# Patient Record
Sex: MALE | Race: OTHER | HISPANIC OR LATINO | URBAN - METROPOLITAN AREA
[De-identification: names, ages, dates, MRNs, and addresses within clinical notes are randomized per-mention and may not be internally consistent; named-entity substitution may affect disease eponyms.]

---

## 2017-05-14 ENCOUNTER — EMERGENCY (EMERGENCY)
Facility: HOSPITAL | Age: 57
LOS: 1 days | Discharge: HOME | End: 2017-05-14
Attending: EMERGENCY MEDICINE
Payer: COMMERCIAL

## 2017-05-14 VITALS
HEART RATE: 72 BPM | TEMPERATURE: 97 F | OXYGEN SATURATION: 100 % | SYSTOLIC BLOOD PRESSURE: 123 MMHG | RESPIRATION RATE: 20 BRPM | HEIGHT: 65 IN | DIASTOLIC BLOOD PRESSURE: 65 MMHG | WEIGHT: 154.98 LBS

## 2017-05-14 VITALS
HEART RATE: 74 BPM | DIASTOLIC BLOOD PRESSURE: 72 MMHG | OXYGEN SATURATION: 98 % | RESPIRATION RATE: 18 BRPM | TEMPERATURE: 98 F | SYSTOLIC BLOOD PRESSURE: 127 MMHG

## 2017-05-14 DIAGNOSIS — R07.89 OTHER CHEST PAIN: ICD-10-CM

## 2017-05-14 DIAGNOSIS — I38 ENDOCARDITIS, VALVE UNSPECIFIED: Chronic | ICD-10-CM

## 2017-05-14 DIAGNOSIS — K21.9 GASTRO-ESOPHAGEAL REFLUX DISEASE WITHOUT ESOPHAGITIS: ICD-10-CM

## 2017-05-14 DIAGNOSIS — Z98.890 OTHER SPECIFIED POSTPROCEDURAL STATES: ICD-10-CM

## 2017-05-14 LAB
ALBUMIN SERPL ELPH-MCNC: 4.2 G/DL — SIGNIFICANT CHANGE UP (ref 3.3–5.2)
ALP SERPL-CCNC: 68 U/L — SIGNIFICANT CHANGE UP (ref 40–120)
ALT FLD-CCNC: 35 U/L — SIGNIFICANT CHANGE UP
ANION GAP SERPL CALC-SCNC: 8 MMOL/L — SIGNIFICANT CHANGE UP (ref 5–17)
AST SERPL-CCNC: 56 U/L — HIGH
BASOPHILS # BLD AUTO: 0 K/UL — SIGNIFICANT CHANGE UP (ref 0–0.2)
BASOPHILS NFR BLD AUTO: 0.3 % — SIGNIFICANT CHANGE UP (ref 0–2)
BILIRUB SERPL-MCNC: 0.7 MG/DL — SIGNIFICANT CHANGE UP (ref 0.4–2)
BUN SERPL-MCNC: 14 MG/DL — SIGNIFICANT CHANGE UP (ref 8–20)
CALCIUM SERPL-MCNC: 9.3 MG/DL — SIGNIFICANT CHANGE UP (ref 8.6–10.2)
CHLORIDE SERPL-SCNC: 98 MMOL/L — SIGNIFICANT CHANGE UP (ref 98–107)
CO2 SERPL-SCNC: 28 MMOL/L — SIGNIFICANT CHANGE UP (ref 22–29)
CREAT SERPL-MCNC: 0.71 MG/DL — SIGNIFICANT CHANGE UP (ref 0.5–1.3)
EOSINOPHIL # BLD AUTO: 0.1 K/UL — SIGNIFICANT CHANGE UP (ref 0–0.5)
EOSINOPHIL NFR BLD AUTO: 0.9 % — SIGNIFICANT CHANGE UP (ref 0–5)
GLUCOSE SERPL-MCNC: 120 MG/DL — HIGH (ref 70–115)
HCT VFR BLD CALC: 42 % — SIGNIFICANT CHANGE UP (ref 42–52)
HGB BLD-MCNC: 14.7 G/DL — SIGNIFICANT CHANGE UP (ref 14–18)
LYMPHOCYTES # BLD AUTO: 0.9 K/UL — LOW (ref 1–4.8)
LYMPHOCYTES # BLD AUTO: 7.6 % — LOW (ref 20–55)
MCHC RBC-ENTMCNC: 32.1 PG — HIGH (ref 27–31)
MCHC RBC-ENTMCNC: 35 G/DL — SIGNIFICANT CHANGE UP (ref 32–36)
MCV RBC AUTO: 91.7 FL — SIGNIFICANT CHANGE UP (ref 80–94)
MONOCYTES # BLD AUTO: 0.8 K/UL — SIGNIFICANT CHANGE UP (ref 0–0.8)
MONOCYTES NFR BLD AUTO: 7.3 % — SIGNIFICANT CHANGE UP (ref 3–10)
NEUTROPHILS # BLD AUTO: 9.7 K/UL — HIGH (ref 1.8–8)
NEUTROPHILS NFR BLD AUTO: 83.6 % — HIGH (ref 37–73)
PLATELET # BLD AUTO: 225 K/UL — SIGNIFICANT CHANGE UP (ref 150–400)
POTASSIUM SERPL-MCNC: 4.6 MMOL/L — SIGNIFICANT CHANGE UP (ref 3.5–5.3)
POTASSIUM SERPL-SCNC: 4.6 MMOL/L — SIGNIFICANT CHANGE UP (ref 3.5–5.3)
PROT SERPL-MCNC: 7.5 G/DL — SIGNIFICANT CHANGE UP (ref 6.6–8.7)
RBC # BLD: 4.58 M/UL — LOW (ref 4.6–6.2)
RBC # FLD: 12.5 % — SIGNIFICANT CHANGE UP (ref 11–15.6)
SODIUM SERPL-SCNC: 134 MMOL/L — LOW (ref 135–145)
TROPONIN T SERPL-MCNC: <0.01 NG/ML — SIGNIFICANT CHANGE UP (ref 0–0.06)
TROPONIN T SERPL-MCNC: <0.01 NG/ML — SIGNIFICANT CHANGE UP (ref 0–0.06)
WBC # BLD: 11.6 K/UL — HIGH (ref 4.8–10.8)
WBC # FLD AUTO: 11.6 K/UL — HIGH (ref 4.8–10.8)

## 2017-05-14 PROCEDURE — 96374 THER/PROPH/DIAG INJ IV PUSH: CPT | Mod: XU

## 2017-05-14 PROCEDURE — 80053 COMPREHEN METABOLIC PANEL: CPT

## 2017-05-14 PROCEDURE — 84100 ASSAY OF PHOSPHORUS: CPT

## 2017-05-14 PROCEDURE — 71046 X-RAY EXAM CHEST 2 VIEWS: CPT

## 2017-05-14 PROCEDURE — 74177 CT ABD & PELVIS W/CONTRAST: CPT | Mod: 26

## 2017-05-14 PROCEDURE — 99284 EMERGENCY DEPT VISIT MOD MDM: CPT | Mod: 25

## 2017-05-14 PROCEDURE — 83690 ASSAY OF LIPASE: CPT

## 2017-05-14 PROCEDURE — 99285 EMERGENCY DEPT VISIT HI MDM: CPT | Mod: 25

## 2017-05-14 PROCEDURE — 85027 COMPLETE CBC AUTOMATED: CPT

## 2017-05-14 PROCEDURE — 71020: CPT | Mod: 26

## 2017-05-14 PROCEDURE — 84484 ASSAY OF TROPONIN QUANT: CPT

## 2017-05-14 PROCEDURE — 93010 ELECTROCARDIOGRAM REPORT: CPT

## 2017-05-14 PROCEDURE — 74177 CT ABD & PELVIS W/CONTRAST: CPT

## 2017-05-14 PROCEDURE — 93005 ELECTROCARDIOGRAM TRACING: CPT

## 2017-05-14 PROCEDURE — 83735 ASSAY OF MAGNESIUM: CPT

## 2017-05-14 RX ORDER — PANTOPRAZOLE SODIUM 20 MG/1
1 TABLET, DELAYED RELEASE ORAL
Qty: 30 | Refills: 0
Start: 2017-05-14 | End: 2017-06-13

## 2017-05-14 RX ORDER — SODIUM CHLORIDE 9 MG/ML
1000 INJECTION INTRAMUSCULAR; INTRAVENOUS; SUBCUTANEOUS
Qty: 0 | Refills: 0 | Status: DISCONTINUED | OUTPATIENT
Start: 2017-05-14 | End: 2017-05-18

## 2017-05-14 RX ORDER — SODIUM CHLORIDE 9 MG/ML
3 INJECTION INTRAMUSCULAR; INTRAVENOUS; SUBCUTANEOUS ONCE
Qty: 0 | Refills: 0 | Status: COMPLETED | OUTPATIENT
Start: 2017-05-14 | End: 2017-05-14

## 2017-05-14 RX ORDER — SODIUM,POTASSIUM PHOSPHATES 278-250MG
2 POWDER IN PACKET (EA) ORAL ONCE
Qty: 0 | Refills: 0 | Status: COMPLETED | OUTPATIENT
Start: 2017-05-14 | End: 2017-05-14

## 2017-05-14 RX ORDER — PANTOPRAZOLE SODIUM 20 MG/1
40 TABLET, DELAYED RELEASE ORAL ONCE
Qty: 0 | Refills: 0 | Status: COMPLETED | OUTPATIENT
Start: 2017-05-14 | End: 2017-05-14

## 2017-05-14 RX ORDER — SODIUM,POTASSIUM PHOSPHATES 278-250MG
2 POWDER IN PACKET (EA) ORAL ONCE
Qty: 0 | Refills: 0 | Status: DISCONTINUED | OUTPATIENT
Start: 2017-05-14 | End: 2017-05-14

## 2017-05-14 RX ADMIN — SODIUM CHLORIDE 125 MILLILITER(S): 9 INJECTION INTRAMUSCULAR; INTRAVENOUS; SUBCUTANEOUS at 05:54

## 2017-05-14 RX ADMIN — PANTOPRAZOLE SODIUM 40 MILLIGRAM(S): 20 TABLET, DELAYED RELEASE ORAL at 05:54

## 2017-05-14 RX ADMIN — Medication 2 TABLET(S): at 07:40

## 2017-05-14 RX ADMIN — SODIUM CHLORIDE 3 MILLILITER(S): 9 INJECTION INTRAMUSCULAR; INTRAVENOUS; SUBCUTANEOUS at 03:38

## 2017-05-14 NOTE — ED PROVIDER NOTE - DETAILS:
I, CARLA Barrera MD, personally performed the services described in the documentation, reviewed the documentation recorded by the scribe in my presence and it accurately and completely records my words and action

## 2017-05-14 NOTE — ED PROVIDER NOTE - NS ED ROS FT
no weight change, no fever or chills  no rash, no bruises  no visual changes no discharge  no cough cold or congestion,   + chest pain  no orthopnea, no pnd  + abd pain  no hematuria, no change in urinary habits  no headache, no paresthesia  no previous psych evaluation

## 2017-05-14 NOTE — ED PROVIDER NOTE - NS ED MD SCRIBE ATTENDING SCRIBE SECTIONS
HIV/VITAL SIGNS( Pullset)/PAST MEDICAL/SURGICAL/SOCIAL HISTORY/DISPOSITION/PHYSICAL EXAM/HISTORY OF PRESENT ILLNESS/REVIEW OF SYSTEMS

## 2017-05-14 NOTE — ED PROVIDER NOTE - PROGRESS NOTE DETAILS
remained comfortable in ed, ct pending troponin  no further complaints   signed out to am ed attending, check ct and troponin

## 2017-05-14 NOTE — ED ADULT NURSE NOTE - OBJECTIVE STATEMENT
Pt states "around 10 pm I started to have chest, abdominal and back pain", denies n/v/d, denies SOB, resp even and unlabored, showing NSR on monitor.

## 2017-05-14 NOTE — ED PROVIDER NOTE - OBJECTIVE STATEMENT
57 y/o M presents to the ED c/o worsening mid chest pain and diffuse abd pain that began 5 hours ago. Pt states his pain does not radiate to any other area. He reports that he has never experienced similar sx in the past. Pt states that his pain is not aggravated or relieved by any factors. Last meal at 1200 yesterday: chicken and rice. Denies fever, chills, or any other complaints at this time. No PMHx.

## 2017-05-14 NOTE — ED ADULT TRIAGE NOTE - CHIEF COMPLAINT QUOTE
Pt c/o cp, sob.  Radiation to back.  Pt states he took OTC Extenze at approx 2000 last night prior to symptom onset.  Pt reports cardiac hx.

## 2017-05-14 NOTE — ED PROVIDER NOTE - PHYSICAL EXAMINATION
Constitutinal :Appears comfortably, talking in full sentences  Head :NC AT , no swelling  Eyes :eomi, no swelling  Mouth :mm moist,  Neck : supple, trachea in midline  Chest :Pete air entry, symm chest expansion, no distress  Heart :S1 S2 distant  Abdomen :abd soft, non tender  Musc/Skel :ext no swelling, no deformity, no spine tenderness, distal pilses present  Neuro  :AAO 3 no focal deficits

## 2017-05-14 NOTE — ED ADULT NURSE REASSESSMENT NOTE - NS ED NURSE REASSESS COMMENT FT1
pt. received from night RN. pt. a&ox3. pt. denies pain or discomfort at this time. on cm. will continue to monitor.

## 2019-02-12 NOTE — ED PROVIDER NOTE - AGGRAVATING FACTORS
Operative Report


DATE OF SURGERY: 02/12/19


Operative Report: 





The risks benefits and alternatives of the procedure explained to the patient in

detail and informed consent is obtained.A GIF Olympus video scope was inserted 

into the patient's mouth and hypopharynx, the esophagus is identified intubated 

and insufflated, the scope was then advanced through the esophagus stomach and 

duodenum, retroflexion maneuver is done, the esophagus stomach and first and 

second portions of the duodenum examined.


PREOPERATIVE DIAGNOSIS: Nausea vomiting, cyclic vomiting syndrome


POSTOPERATIVE DIAGNOSIS: Esophagitis improved.  Hiatal hernia.  Residual food 

consistent with cyclic vomiting syndrome and gastroparesis status post Botox 

injection


OPERATION: EGD with submucosal injection


SURGEON: ROSA BRANDT


ANESTHESIA: Moderate Sedation - 4 mg of Versed, 75 mcg of fentanyl.  4 mg of 

Zofran.  Conscious sedation monitoring time 30 minutes.


TISSUE REMOVED OR ALTERED: None.


COMPLICATIONS: 





None.


ESTIMATED BLOOD LOSS: As noted above.


INTRAOPERATIVE FINDINGS: As noted above.


PROCEDURE: 





Patient tolerated the procedure well.


No immediate postprocedure complications are noted.


Patient discharged in good condition.


Discharge date 2/12/2019.


Discharge diet: Regular.


Discharge activity: Regular.


2-3-week follow-up to discuss findings.


Patient is instructed call the office or proceed to the emergency room should 

there be any further problems or questions.
none

## 2019-09-06 ENCOUNTER — EMERGENCY (EMERGENCY)
Facility: HOSPITAL | Age: 59
LOS: 1 days | Discharge: HOME | End: 2019-09-06
Attending: EMERGENCY MEDICINE
Payer: COMMERCIAL

## 2019-09-06 VITALS
HEIGHT: 64 IN | RESPIRATION RATE: 18 BRPM | HEART RATE: 69 BPM | SYSTOLIC BLOOD PRESSURE: 113 MMHG | DIASTOLIC BLOOD PRESSURE: 64 MMHG | TEMPERATURE: 99 F | WEIGHT: 167.99 LBS | OXYGEN SATURATION: 99 %

## 2019-09-06 DIAGNOSIS — I38 ENDOCARDITIS, VALVE UNSPECIFIED: Chronic | ICD-10-CM

## 2019-09-06 LAB
ALBUMIN SERPL ELPH-MCNC: 4.3 G/DL — SIGNIFICANT CHANGE UP (ref 3.3–5.2)
ALP SERPL-CCNC: 96 U/L — SIGNIFICANT CHANGE UP (ref 40–120)
ALT FLD-CCNC: 64 U/L — HIGH
ANION GAP SERPL CALC-SCNC: 11 MMOL/L — SIGNIFICANT CHANGE UP (ref 5–17)
AST SERPL-CCNC: 104 U/L — HIGH
BASOPHILS # BLD AUTO: 0.04 K/UL — SIGNIFICANT CHANGE UP (ref 0–0.2)
BASOPHILS NFR BLD AUTO: 0.4 % — SIGNIFICANT CHANGE UP (ref 0–2)
BILIRUB SERPL-MCNC: 1.1 MG/DL — SIGNIFICANT CHANGE UP (ref 0.4–2)
BUN SERPL-MCNC: 16 MG/DL — SIGNIFICANT CHANGE UP (ref 8–20)
CALCIUM SERPL-MCNC: 9.5 MG/DL — SIGNIFICANT CHANGE UP (ref 8.6–10.2)
CHLORIDE SERPL-SCNC: 100 MMOL/L — SIGNIFICANT CHANGE UP (ref 98–107)
CO2 SERPL-SCNC: 29 MMOL/L — SIGNIFICANT CHANGE UP (ref 22–29)
CREAT SERPL-MCNC: 0.99 MG/DL — SIGNIFICANT CHANGE UP (ref 0.5–1.3)
EOSINOPHIL # BLD AUTO: 0.09 K/UL — SIGNIFICANT CHANGE UP (ref 0–0.5)
EOSINOPHIL NFR BLD AUTO: 0.9 % — SIGNIFICANT CHANGE UP (ref 0–6)
GLUCOSE SERPL-MCNC: 106 MG/DL — SIGNIFICANT CHANGE UP (ref 70–115)
HCT VFR BLD CALC: 43.4 % — SIGNIFICANT CHANGE UP (ref 39–50)
HGB BLD-MCNC: 14.7 G/DL — SIGNIFICANT CHANGE UP (ref 13–17)
IMM GRANULOCYTES NFR BLD AUTO: 0.4 % — SIGNIFICANT CHANGE UP (ref 0–1.5)
LIDOCAIN IGE QN: 22 U/L — SIGNIFICANT CHANGE UP (ref 22–51)
LYMPHOCYTES # BLD AUTO: 1.09 K/UL — SIGNIFICANT CHANGE UP (ref 1–3.3)
LYMPHOCYTES # BLD AUTO: 11.1 % — LOW (ref 13–44)
MCHC RBC-ENTMCNC: 32 PG — SIGNIFICANT CHANGE UP (ref 27–34)
MCHC RBC-ENTMCNC: 33.9 GM/DL — SIGNIFICANT CHANGE UP (ref 32–36)
MCV RBC AUTO: 94.6 FL — SIGNIFICANT CHANGE UP (ref 80–100)
MONOCYTES # BLD AUTO: 0.7 K/UL — SIGNIFICANT CHANGE UP (ref 0–0.9)
MONOCYTES NFR BLD AUTO: 7.2 % — SIGNIFICANT CHANGE UP (ref 2–14)
NEUTROPHILS # BLD AUTO: 7.83 K/UL — HIGH (ref 1.8–7.4)
NEUTROPHILS NFR BLD AUTO: 80 % — HIGH (ref 43–77)
PLATELET # BLD AUTO: 252 K/UL — SIGNIFICANT CHANGE UP (ref 150–400)
POTASSIUM SERPL-MCNC: 4 MMOL/L — SIGNIFICANT CHANGE UP (ref 3.5–5.3)
POTASSIUM SERPL-SCNC: 4 MMOL/L — SIGNIFICANT CHANGE UP (ref 3.5–5.3)
PROT SERPL-MCNC: 7.5 G/DL — SIGNIFICANT CHANGE UP (ref 6.6–8.7)
RBC # BLD: 4.59 M/UL — SIGNIFICANT CHANGE UP (ref 4.2–5.8)
RBC # FLD: 11.9 % — SIGNIFICANT CHANGE UP (ref 10.3–14.5)
SODIUM SERPL-SCNC: 140 MMOL/L — SIGNIFICANT CHANGE UP (ref 135–145)
WBC # BLD: 9.79 K/UL — SIGNIFICANT CHANGE UP (ref 3.8–10.5)
WBC # FLD AUTO: 9.79 K/UL — SIGNIFICANT CHANGE UP (ref 3.8–10.5)

## 2019-09-06 PROCEDURE — 99284 EMERGENCY DEPT VISIT MOD MDM: CPT | Mod: 25

## 2019-09-06 PROCEDURE — 99284 EMERGENCY DEPT VISIT MOD MDM: CPT

## 2019-09-06 PROCEDURE — 76705 ECHO EXAM OF ABDOMEN: CPT | Mod: 26

## 2019-09-06 PROCEDURE — 76705 ECHO EXAM OF ABDOMEN: CPT

## 2019-09-06 PROCEDURE — 80053 COMPREHEN METABOLIC PANEL: CPT

## 2019-09-06 PROCEDURE — 36415 COLL VENOUS BLD VENIPUNCTURE: CPT

## 2019-09-06 PROCEDURE — 85027 COMPLETE CBC AUTOMATED: CPT

## 2019-09-06 PROCEDURE — 83690 ASSAY OF LIPASE: CPT

## 2019-09-06 NOTE — ED STATDOCS - OBJECTIVE STATEMENT
59 y/o M pt with significant PMHx of cholelithiasis presents to the ED c/o RUQ abdominal pain, onset this morning at 09:00. The pain is described as a squeezing sensation, and the pain radiates to the back. Associated sx of diaphoresis. Patient feels like he is unable to take a deep breath secondary to his pain. He reports that he has had multiple episodes of this abdominal pain for 1 year, varying in length for each episode. Today, he was given 800 mg of ibuprofen for his symptoms. Patient went to the ED on 04/25/18 for one of his previous episodes, and was diagnosed with Gall stones, but he recently had a sonogram performed on August 22nd, which was benign. Denies nausea, vomiting, dysuria, fever, testicular pain or penile pain. No further acute complaints at this time. 57 y/o M pt with significant PMHx of cholelithiasis presents to the ED c/o RUQ abdominal pain, onset this morning at 09:00. The pain is described as a squeezing sensation, and the pain radiates to the back. Associated sx of diaphoresis. Patient feels like he is unable to take a deep breath secondary to his pain. He reports that he has had multiple episodes of this abdominal pain for 1 year, varying in length for each episode. Had sono on 04/25/18 which demonstrated gallstones. Denies nausea, vomiting, dysuria, fever, testicular pain or penile pain. No further acute complaints at this time.

## 2019-09-06 NOTE — ED STATDOCS - PROGRESS NOTE DETAILS
PT evaluated by intake physician. HPI/PE/ROS as noted above. Will follow up plan per intake physician. Pt reevaluated, abdomen soft NT. PT denies pain at this time. PT toleratoing PO. PT verbalized understanding of diagnosis and importance of follow up at PMD. PT educated on importance of follow up and when to return to the ED. f/u with ACS

## 2019-09-06 NOTE — ED STATDOCS - PATIENT PORTAL LINK FT
You can access the FollowMyHealth Patient Portal offered by [facility] by registering at the following website: [email or web address]. By joining [facility]’s FollowMyHealth portal, you will also be able to view your health information using other applications (apps) compatible with our system.

## 2019-09-06 NOTE — ED STATDOCS - NS_ ATTENDINGSCRIBEDETAILS _ED_A_ED_FT
I, Joby Padilla, performed the initial face to face bedside interview with this patient regarding history of present illness, review of symptoms and relevant past medical, social and family history.  I completed an independent physical examination.  I was the provider who initially evaluated this patient.  The history, relevant review of systems, past medical and surgical history, medical decision making, and physical examination was documented by the scribe in my presence and I attest to the accuracy of the documentation. Follow-up on ordered tests (ie labs, radiologic studies) and re-evaluation of the patient's status has been communicated to the ACP.  Disposition of the patient will be based on test outcome and response to ED interventions.

## 2019-09-06 NOTE — ED ADULT NURSE NOTE - OBJECTIVE STATEMENT
58 yom presents to ed c/o RLQ pain denies n/v/d afebrile. gregg. seen treated released by md for similar pain told had gallstones

## 2019-09-18 PROBLEM — Z00.00 ENCOUNTER FOR PREVENTIVE HEALTH EXAMINATION: Status: ACTIVE | Noted: 2019-09-18

## 2019-09-19 ENCOUNTER — APPOINTMENT (OUTPATIENT)
Dept: TRAUMA SURGERY | Facility: CLINIC | Age: 59
End: 2019-09-19
Payer: COMMERCIAL

## 2019-09-19 VITALS
DIASTOLIC BLOOD PRESSURE: 64 MMHG | HEART RATE: 75 BPM | HEIGHT: 64.5 IN | SYSTOLIC BLOOD PRESSURE: 120 MMHG | BODY MASS INDEX: 27.66 KG/M2 | RESPIRATION RATE: 16 BRPM | WEIGHT: 164 LBS | OXYGEN SATURATION: 98 % | TEMPERATURE: 97.9 F

## 2019-09-19 DIAGNOSIS — K80.20 CALCULUS OF GALLBLADDER W/OUT CHOLECYSTITIS W/OUT OBSTRUCTION: ICD-10-CM

## 2019-09-19 DIAGNOSIS — Z83.6 FAMILY HISTORY OF OTHER DISEASES OF THE RESPIRATORY SYSTEM: ICD-10-CM

## 2019-09-19 DIAGNOSIS — Z80.49 FAMILY HISTORY OF MALIGNANT NEOPLASM OF OTHER GENITAL ORGANS: ICD-10-CM

## 2019-09-19 PROCEDURE — 99214 OFFICE O/P EST MOD 30 MIN: CPT

## 2019-09-19 NOTE — PLAN
[FreeTextEntry1] : The patient is for cardiac clearance.\par Once cleared, the patient will be scheduled for an elective laparoscopic versus open cholecystectomy and umbilical hernia repair.

## 2019-09-19 NOTE — PHYSICAL EXAM
[JVD] : no jugular venous distention  [Normal Breath Sounds] : Normal breath sounds [Normal Heart Sounds] : normal heart sounds [Abdomen Tenderness] : ~T ~M No abdominal tenderness [de-identified] : NAD [de-identified] : Abdomen is soft, nontender to time, examination of there is a reducible umbilical hernia appreciated.

## 2019-09-19 NOTE — REASON FOR VISIT
[Acute] : an acute visit [Spouse] : spouse [FreeTextEntry1] : The patient was seen in the emergency room for acute biliary colic. He is referred here for therapy and gallbladder removal

## 2019-09-23 ENCOUNTER — NON-APPOINTMENT (OUTPATIENT)
Age: 59
End: 2019-09-23

## 2019-09-23 ENCOUNTER — APPOINTMENT (OUTPATIENT)
Dept: CARDIOLOGY | Facility: CLINIC | Age: 59
End: 2019-09-23
Payer: COMMERCIAL

## 2019-09-23 VITALS
BODY MASS INDEX: 27.66 KG/M2 | HEIGHT: 64 IN | WEIGHT: 162 LBS | HEART RATE: 68 BPM | SYSTOLIC BLOOD PRESSURE: 118 MMHG | OXYGEN SATURATION: 95 % | DIASTOLIC BLOOD PRESSURE: 69 MMHG

## 2019-09-23 VITALS — SYSTOLIC BLOOD PRESSURE: 118 MMHG | DIASTOLIC BLOOD PRESSURE: 66 MMHG

## 2019-09-23 DIAGNOSIS — R94.31 ABNORMAL ELECTROCARDIOGRAM [ECG] [EKG]: ICD-10-CM

## 2019-09-23 DIAGNOSIS — Z78.9 OTHER SPECIFIED HEALTH STATUS: ICD-10-CM

## 2019-09-23 DIAGNOSIS — Q21.0 VENTRICULAR SEPTAL DEFECT: ICD-10-CM

## 2019-09-23 PROCEDURE — 99203 OFFICE O/P NEW LOW 30 MIN: CPT

## 2019-09-23 PROCEDURE — 93000 ELECTROCARDIOGRAM COMPLETE: CPT

## 2019-10-10 ENCOUNTER — OUTPATIENT (OUTPATIENT)
Dept: OUTPATIENT SERVICES | Facility: HOSPITAL | Age: 59
LOS: 1 days | End: 2019-10-10
Payer: COMMERCIAL

## 2019-10-10 DIAGNOSIS — Q21.0 VENTRICULAR SEPTAL DEFECT: ICD-10-CM

## 2019-10-10 DIAGNOSIS — R94.31 ABNORMAL ELECTROCARDIOGRAM [ECG] [EKG]: ICD-10-CM

## 2019-10-10 DIAGNOSIS — I38 ENDOCARDITIS, VALVE UNSPECIFIED: Chronic | ICD-10-CM

## 2019-10-10 PROCEDURE — 93018 CV STRESS TEST I&R ONLY: CPT

## 2019-10-10 PROCEDURE — 93016 CV STRESS TEST SUPVJ ONLY: CPT

## 2019-10-10 PROCEDURE — 93017 CV STRESS TEST TRACING ONLY: CPT

## 2019-10-10 PROCEDURE — 93306 TTE W/DOPPLER COMPLETE: CPT | Mod: 26

## 2019-10-10 PROCEDURE — 93306 TTE W/DOPPLER COMPLETE: CPT

## 2019-10-11 NOTE — ASSESSMENT
[FreeTextEntry1] : EKG- NSR, RBBB\par \par Assessment:\par 1. Abnormal EKG\par 2. S/P VSD Repair at age 15 at [facility]\par \par Recommendations:\par 1. ECHO\par 2. Stress Test\par 3. F/U in 1 year\par \par Patients perioperative cardiac risk assessment to be addressed after the stress test.

## 2019-10-11 NOTE — ADDENDUM
[FreeTextEntry1] : Stress Test- Normal.\par ECHO- LVEF normal. MVP with Mild to Moderte MR otherwise normal.\par \par PATIENT IS LOW RISK FOR PERIOPERATIVE CARDIAC EVENTS.\par NO ABSOLUTE CONTRAINDICATIONS TO PROCEED WITH THE GALLBLADEER SURGERY..

## 2019-10-11 NOTE — PHYSICAL EXAM
[General Appearance - Well Developed] : well developed [General Appearance - Well Nourished] : well nourished [Normal Conjunctiva] : the conjunctiva exhibited no abnormalities [Normal Oral Mucosa] : normal oral mucosa [Heart Sounds] : normal S1 and S2 [Auscultation Breath Sounds / Voice Sounds] : lungs were clear to auscultation bilaterally [Bowel Sounds] : normal bowel sounds [Abdomen Soft] : soft [Abnormal Walk] : normal gait [] : no rash [FreeTextEntry1] : No JVD

## 2019-10-11 NOTE — HISTORY OF PRESENT ILLNESS
[FreeTextEntry1] : Preop for Cholecystectomy.\par \par The patient is a 58-year-old  male with a history of a VSD repair at age 15 presents for a preoperative cardiac evaluation prior to undergoing cholecystectomy. Patient denies any chest pain, dyspnea, palpitations, syncope. Patient has no complaints except for symptoms related to his gallbladder.\par \par Patient has no history of diabetes, no history of hypertension, no history of prior coronary artery disease, no history of CHF. No history of cardiac arrhythmias.\par \par Patients aunt in the room

## 2019-10-26 ENCOUNTER — INPATIENT (INPATIENT)
Facility: HOSPITAL | Age: 59
LOS: 2 days | Discharge: HOME | DRG: 419 | End: 2019-10-29
Attending: STUDENT IN AN ORGANIZED HEALTH CARE EDUCATION/TRAINING PROGRAM | Admitting: STUDENT IN AN ORGANIZED HEALTH CARE EDUCATION/TRAINING PROGRAM
Payer: COMMERCIAL

## 2019-10-26 VITALS
DIASTOLIC BLOOD PRESSURE: 72 MMHG | OXYGEN SATURATION: 97 % | TEMPERATURE: 98 F | SYSTOLIC BLOOD PRESSURE: 123 MMHG | HEART RATE: 70 BPM | WEIGHT: 166.89 LBS | RESPIRATION RATE: 18 BRPM | HEIGHT: 65 IN

## 2019-10-26 DIAGNOSIS — I38 ENDOCARDITIS, VALVE UNSPECIFIED: Chronic | ICD-10-CM

## 2019-10-26 PROCEDURE — 99285 EMERGENCY DEPT VISIT HI MDM: CPT

## 2019-10-26 RX ORDER — SODIUM CHLORIDE 9 MG/ML
1000 INJECTION INTRAMUSCULAR; INTRAVENOUS; SUBCUTANEOUS ONCE
Refills: 0 | Status: COMPLETED | OUTPATIENT
Start: 2019-10-26 | End: 2019-10-26

## 2019-10-26 RX ORDER — ONDANSETRON 8 MG/1
4 TABLET, FILM COATED ORAL ONCE
Refills: 0 | Status: DISCONTINUED | OUTPATIENT
Start: 2019-10-26 | End: 2019-10-28

## 2019-10-26 RX ORDER — MORPHINE SULFATE 50 MG/1
4 CAPSULE, EXTENDED RELEASE ORAL ONCE
Refills: 0 | Status: DISCONTINUED | OUTPATIENT
Start: 2019-10-26 | End: 2019-10-26

## 2019-10-26 NOTE — ED ADULT NURSE NOTE - CHPI ED NUR SYMPTOMS NEG
no blood in stool/no chills/no hematuria/no fever/no nausea/no abdominal distension/no diarrhea/no vomiting/no burning urination

## 2019-10-26 NOTE — ED ADULT TRIAGE NOTE - CHIEF COMPLAINT QUOTE
"I am having a gall bladder attack".  Pt. complaining of RLQ and RUQ abdominal pain that began thursday but became significantly worse today.  Pt. states he was recently seen here 10/19/19 and was examined by MD Gandhi; pt. was supposed to receive a call to schedule his gall bladder out but has not yet and pain has come back.  Pt. denies n/v/d.

## 2019-10-26 NOTE — ED PROVIDER NOTE - PROGRESS NOTE DETAILS
Surgery called Case s/o to Dr Shearer Pt signed out to me by Dr. Hansen pending labs and imaging.  US shows cholelithiasis. Pt with gallstone pancreatitis and elevated liver enzymes. surgery to admit.

## 2019-10-26 NOTE — ED PROVIDER NOTE - CLINICAL SUMMARY MEDICAL DECISION MAKING FREE TEXT BOX
59 y/o M with hx of gallstones presents with RUQ pain concern for cholecysits obtain us labs and reeval. 57 y/o M pt, with hx of gallstones, presents with RUQ pain. Concern for cholecystitis. Will obtain US, labs, and reevaluate.

## 2019-10-26 NOTE — ED PROVIDER NOTE - OBJECTIVE STATEMENT
59 y/o present with 4x RUQ abd pain no nvd     dx gallstones in the past but has not f/u since. No cp sob. worse with food intake  ros abd pain  pe appear well   ruq ttp  (+) beyer   RLQ tenderness 57 y/o M pt with PMHx of cholelithiasis presents to the ED c/o RUQ abd pain that began 4 days ago. Denies nausea, vomiting, and diarrhea. Pt says that he was Dx with gallstones in the past but has not followup with his MD for the condition. Notes that pain is worsened with food intake.  Also denies CP and SOB. No further acute complaints at this time.

## 2019-10-27 ENCOUNTER — TRANSCRIPTION ENCOUNTER (OUTPATIENT)
Age: 59
End: 2019-10-27

## 2019-10-27 DIAGNOSIS — K85.10 BILIARY ACUTE PANCREATITIS WITHOUT NECROSIS OR INFECTION: ICD-10-CM

## 2019-10-27 LAB
ALBUMIN SERPL ELPH-MCNC: 3.7 G/DL — SIGNIFICANT CHANGE UP (ref 3.3–5.2)
ALBUMIN SERPL ELPH-MCNC: 4.1 G/DL — SIGNIFICANT CHANGE UP (ref 3.3–5.2)
ALP SERPL-CCNC: 129 U/L — HIGH (ref 40–120)
ALP SERPL-CCNC: 137 U/L — HIGH (ref 40–120)
ALT FLD-CCNC: 124 U/L — HIGH
ALT FLD-CCNC: 160 U/L — HIGH
ANION GAP SERPL CALC-SCNC: 13 MMOL/L — SIGNIFICANT CHANGE UP (ref 5–17)
ANION GAP SERPL CALC-SCNC: 14 MMOL/L — SIGNIFICANT CHANGE UP (ref 5–17)
APPEARANCE UR: CLEAR — SIGNIFICANT CHANGE UP
AST SERPL-CCNC: 106 U/L — HIGH
AST SERPL-CCNC: 63 U/L — HIGH
BACTERIA # UR AUTO: ABNORMAL
BASOPHILS # BLD AUTO: 0.04 K/UL — SIGNIFICANT CHANGE UP (ref 0–0.2)
BASOPHILS # BLD AUTO: 0.05 K/UL — SIGNIFICANT CHANGE UP (ref 0–0.2)
BASOPHILS NFR BLD AUTO: 0.4 % — SIGNIFICANT CHANGE UP (ref 0–2)
BASOPHILS NFR BLD AUTO: 0.6 % — SIGNIFICANT CHANGE UP (ref 0–2)
BILIRUB SERPL-MCNC: 1.5 MG/DL — SIGNIFICANT CHANGE UP (ref 0.4–2)
BILIRUB SERPL-MCNC: 4 MG/DL — HIGH (ref 0.4–2)
BILIRUB UR-MCNC: NEGATIVE — SIGNIFICANT CHANGE UP
BLD GP AB SCN SERPL QL: SIGNIFICANT CHANGE UP
BUN SERPL-MCNC: 11 MG/DL — SIGNIFICANT CHANGE UP (ref 8–20)
BUN SERPL-MCNC: 9 MG/DL — SIGNIFICANT CHANGE UP (ref 8–20)
CALCIUM SERPL-MCNC: 9 MG/DL — SIGNIFICANT CHANGE UP (ref 8.6–10.2)
CALCIUM SERPL-MCNC: 9.2 MG/DL — SIGNIFICANT CHANGE UP (ref 8.6–10.2)
CHLORIDE SERPL-SCNC: 100 MMOL/L — SIGNIFICANT CHANGE UP (ref 98–107)
CHLORIDE SERPL-SCNC: 102 MMOL/L — SIGNIFICANT CHANGE UP (ref 98–107)
CO2 SERPL-SCNC: 22 MMOL/L — SIGNIFICANT CHANGE UP (ref 22–29)
CO2 SERPL-SCNC: 24 MMOL/L — SIGNIFICANT CHANGE UP (ref 22–29)
COLOR SPEC: YELLOW — SIGNIFICANT CHANGE UP
CREAT SERPL-MCNC: 0.65 MG/DL — SIGNIFICANT CHANGE UP (ref 0.5–1.3)
CREAT SERPL-MCNC: 0.69 MG/DL — SIGNIFICANT CHANGE UP (ref 0.5–1.3)
DIFF PNL FLD: ABNORMAL
EOSINOPHIL # BLD AUTO: 0.13 K/UL — SIGNIFICANT CHANGE UP (ref 0–0.5)
EOSINOPHIL # BLD AUTO: 0.15 K/UL — SIGNIFICANT CHANGE UP (ref 0–0.5)
EOSINOPHIL NFR BLD AUTO: 1.7 % — SIGNIFICANT CHANGE UP (ref 0–6)
EOSINOPHIL NFR BLD AUTO: 1.7 % — SIGNIFICANT CHANGE UP (ref 0–6)
EPI CELLS # UR: SIGNIFICANT CHANGE UP
GLUCOSE SERPL-MCNC: 110 MG/DL — SIGNIFICANT CHANGE UP (ref 70–115)
GLUCOSE SERPL-MCNC: 80 MG/DL — SIGNIFICANT CHANGE UP (ref 70–115)
GLUCOSE UR QL: NEGATIVE MG/DL — SIGNIFICANT CHANGE UP
HCT VFR BLD CALC: 40 % — SIGNIFICANT CHANGE UP (ref 39–50)
HCT VFR BLD CALC: 40.8 % — SIGNIFICANT CHANGE UP (ref 39–50)
HGB BLD-MCNC: 13.4 G/DL — SIGNIFICANT CHANGE UP (ref 13–17)
HGB BLD-MCNC: 13.9 G/DL — SIGNIFICANT CHANGE UP (ref 13–17)
IMM GRANULOCYTES NFR BLD AUTO: 0.3 % — SIGNIFICANT CHANGE UP (ref 0–1.5)
IMM GRANULOCYTES NFR BLD AUTO: 0.5 % — SIGNIFICANT CHANGE UP (ref 0–1.5)
KETONES UR-MCNC: NEGATIVE — SIGNIFICANT CHANGE UP
LDH SERPL L TO P-CCNC: 150 U/L — SIGNIFICANT CHANGE UP (ref 98–192)
LEUKOCYTE ESTERASE UR-ACNC: NEGATIVE — SIGNIFICANT CHANGE UP
LIDOCAIN IGE QN: 2828 U/L — HIGH (ref 22–51)
LYMPHOCYTES # BLD AUTO: 1.21 K/UL — SIGNIFICANT CHANGE UP (ref 1–3.3)
LYMPHOCYTES # BLD AUTO: 1.48 K/UL — SIGNIFICANT CHANGE UP (ref 1–3.3)
LYMPHOCYTES # BLD AUTO: 15.5 % — SIGNIFICANT CHANGE UP (ref 13–44)
LYMPHOCYTES # BLD AUTO: 16.4 % — SIGNIFICANT CHANGE UP (ref 13–44)
MAGNESIUM SERPL-MCNC: 1.9 MG/DL — SIGNIFICANT CHANGE UP (ref 1.6–2.6)
MCHC RBC-ENTMCNC: 31.5 PG — SIGNIFICANT CHANGE UP (ref 27–34)
MCHC RBC-ENTMCNC: 31.8 PG — SIGNIFICANT CHANGE UP (ref 27–34)
MCHC RBC-ENTMCNC: 33.5 GM/DL — SIGNIFICANT CHANGE UP (ref 32–36)
MCHC RBC-ENTMCNC: 34.1 GM/DL — SIGNIFICANT CHANGE UP (ref 32–36)
MCV RBC AUTO: 93.4 FL — SIGNIFICANT CHANGE UP (ref 80–100)
MCV RBC AUTO: 93.9 FL — SIGNIFICANT CHANGE UP (ref 80–100)
MONOCYTES # BLD AUTO: 0.74 K/UL — SIGNIFICANT CHANGE UP (ref 0–0.9)
MONOCYTES # BLD AUTO: 0.8 K/UL — SIGNIFICANT CHANGE UP (ref 0–0.9)
MONOCYTES NFR BLD AUTO: 8.9 % — SIGNIFICANT CHANGE UP (ref 2–14)
MONOCYTES NFR BLD AUTO: 9.5 % — SIGNIFICANT CHANGE UP (ref 2–14)
NEUTROPHILS # BLD AUTO: 5.66 K/UL — SIGNIFICANT CHANGE UP (ref 1.8–7.4)
NEUTROPHILS # BLD AUTO: 6.51 K/UL — SIGNIFICANT CHANGE UP (ref 1.8–7.4)
NEUTROPHILS NFR BLD AUTO: 72.2 % — SIGNIFICANT CHANGE UP (ref 43–77)
NEUTROPHILS NFR BLD AUTO: 72.3 % — SIGNIFICANT CHANGE UP (ref 43–77)
NITRITE UR-MCNC: NEGATIVE — SIGNIFICANT CHANGE UP
PH UR: 6 — SIGNIFICANT CHANGE UP (ref 5–8)
PLATELET # BLD AUTO: 218 K/UL — SIGNIFICANT CHANGE UP (ref 150–400)
PLATELET # BLD AUTO: 242 K/UL — SIGNIFICANT CHANGE UP (ref 150–400)
POTASSIUM SERPL-MCNC: 3.9 MMOL/L — SIGNIFICANT CHANGE UP (ref 3.5–5.3)
POTASSIUM SERPL-MCNC: 4.3 MMOL/L — SIGNIFICANT CHANGE UP (ref 3.5–5.3)
POTASSIUM SERPL-SCNC: 3.9 MMOL/L — SIGNIFICANT CHANGE UP (ref 3.5–5.3)
POTASSIUM SERPL-SCNC: 4.3 MMOL/L — SIGNIFICANT CHANGE UP (ref 3.5–5.3)
PROT SERPL-MCNC: 6.6 G/DL — SIGNIFICANT CHANGE UP (ref 6.6–8.7)
PROT SERPL-MCNC: 6.9 G/DL — SIGNIFICANT CHANGE UP (ref 6.6–8.7)
PROT UR-MCNC: 15 MG/DL
RBC # BLD: 4.26 M/UL — SIGNIFICANT CHANGE UP (ref 4.2–5.8)
RBC # BLD: 4.37 M/UL — SIGNIFICANT CHANGE UP (ref 4.2–5.8)
RBC # FLD: 11.9 % — SIGNIFICANT CHANGE UP (ref 10.3–14.5)
RBC # FLD: 12.1 % — SIGNIFICANT CHANGE UP (ref 10.3–14.5)
RBC CASTS # UR COMP ASSIST: ABNORMAL /HPF (ref 0–4)
SODIUM SERPL-SCNC: 136 MMOL/L — SIGNIFICANT CHANGE UP (ref 135–145)
SODIUM SERPL-SCNC: 139 MMOL/L — SIGNIFICANT CHANGE UP (ref 135–145)
SP GR SPEC: 1.02 — SIGNIFICANT CHANGE UP (ref 1.01–1.02)
UROBILINOGEN FLD QL: NEGATIVE MG/DL — SIGNIFICANT CHANGE UP
WBC # BLD: 7.83 K/UL — SIGNIFICANT CHANGE UP (ref 3.8–10.5)
WBC # BLD: 9.01 K/UL — SIGNIFICANT CHANGE UP (ref 3.8–10.5)
WBC # FLD AUTO: 7.83 K/UL — SIGNIFICANT CHANGE UP (ref 3.8–10.5)
WBC # FLD AUTO: 9.01 K/UL — SIGNIFICANT CHANGE UP (ref 3.8–10.5)
WBC UR QL: SIGNIFICANT CHANGE UP

## 2019-10-27 PROCEDURE — 93010 ELECTROCARDIOGRAM REPORT: CPT

## 2019-10-27 PROCEDURE — 99223 1ST HOSP IP/OBS HIGH 75: CPT | Mod: 57

## 2019-10-27 PROCEDURE — 71045 X-RAY EXAM CHEST 1 VIEW: CPT | Mod: 26

## 2019-10-27 PROCEDURE — 76705 ECHO EXAM OF ABDOMEN: CPT | Mod: 26

## 2019-10-27 RX ORDER — MORPHINE SULFATE 50 MG/1
4 CAPSULE, EXTENDED RELEASE ORAL EVERY 4 HOURS
Refills: 0 | Status: DISCONTINUED | OUTPATIENT
Start: 2019-10-27 | End: 2019-10-28

## 2019-10-27 RX ORDER — ONDANSETRON 8 MG/1
4 TABLET, FILM COATED ORAL EVERY 6 HOURS
Refills: 0 | Status: DISCONTINUED | OUTPATIENT
Start: 2019-10-27 | End: 2019-10-28

## 2019-10-27 RX ORDER — IBUPROFEN 200 MG
600 TABLET ORAL EVERY 6 HOURS
Refills: 0 | Status: DISCONTINUED | OUTPATIENT
Start: 2019-10-27 | End: 2019-10-28

## 2019-10-27 RX ORDER — SODIUM CHLORIDE 9 MG/ML
1000 INJECTION, SOLUTION INTRAVENOUS
Refills: 0 | Status: DISCONTINUED | OUTPATIENT
Start: 2019-10-27 | End: 2019-10-28

## 2019-10-27 RX ORDER — MORPHINE SULFATE 50 MG/1
2 CAPSULE, EXTENDED RELEASE ORAL EVERY 4 HOURS
Refills: 0 | Status: DISCONTINUED | OUTPATIENT
Start: 2019-10-27 | End: 2019-10-28

## 2019-10-27 RX ORDER — HEPARIN SODIUM 5000 [USP'U]/ML
5000 INJECTION INTRAVENOUS; SUBCUTANEOUS EVERY 8 HOURS
Refills: 0 | Status: DISCONTINUED | OUTPATIENT
Start: 2019-10-27 | End: 2019-10-28

## 2019-10-27 RX ADMIN — MORPHINE SULFATE 4 MILLIGRAM(S): 50 CAPSULE, EXTENDED RELEASE ORAL at 02:39

## 2019-10-27 RX ADMIN — SODIUM CHLORIDE 150 MILLILITER(S): 9 INJECTION, SOLUTION INTRAVENOUS at 15:29

## 2019-10-27 RX ADMIN — HEPARIN SODIUM 5000 UNIT(S): 5000 INJECTION INTRAVENOUS; SUBCUTANEOUS at 15:20

## 2019-10-27 RX ADMIN — SODIUM CHLORIDE 150 MILLILITER(S): 9 INJECTION, SOLUTION INTRAVENOUS at 21:31

## 2019-10-27 RX ADMIN — HEPARIN SODIUM 5000 UNIT(S): 5000 INJECTION INTRAVENOUS; SUBCUTANEOUS at 21:31

## 2019-10-27 RX ADMIN — SODIUM CHLORIDE 150 MILLILITER(S): 9 INJECTION, SOLUTION INTRAVENOUS at 08:44

## 2019-10-27 RX ADMIN — SODIUM CHLORIDE 1000 MILLILITER(S): 9 INJECTION INTRAMUSCULAR; INTRAVENOUS; SUBCUTANEOUS at 02:33

## 2019-10-27 NOTE — H&P ADULT - HISTORY OF PRESENT ILLNESS
ACUTE CARE SURGERY CONSULT    HPI: 58y Male w no PMH/PSH that presented to ED c/o severe RUQ pain that started 3 days ago and is described as sharp and constant associated with chills. Pain is not associated with meals, fevers, and vomiting. Patient came to the ED almost a month ago with similar complaints and was diagnosed with cholelithiasis and given a follow up appointment with surgery for cholecystectomy as an outpatient. Patient has no other complaints. Last meal was yesterday in the afternoon and is having regular BM and passing flatus.     ROS: 10-system review is otherwise negative except HPI above.      PAST MEDICAL & SURGICAL HISTORY:  No pertinent past medical history  Heart valve regurgitation: as infant  Heart surgery for valve regurgitation at 16yo      SOCIAL HISTORY:  Denies any toxic habits    ALLERGIES: NKA No Known Allergies      HOME MEDICATIONS:   No home medications   --------------------------------------------------------------------------------------------  PHYSICAL EXAM:   General: NAD, Lying in bed comfortably  Neuro: A+Ox3  HEENT: EOMI, PERDELIA, MMM  Cardio: RRR   Resp: Non labored breathing   GI/Abd: Soft, NT/ND, no rebound/guarding, no masses palpated  Musculoskeletal: All 4 extremities moving spontaneously, no limitations, no spinal tenderness or stepoffs  --------------------------------------------------------------------------------------------    LABS                 13.9   7.83   )----------(  242       ( 27 Oct 2019 00:53 )               40.8      136    |  100    |  11.0   ----------------------------<  110        ( 27 Oct 2019 00:53 )  3.9     |  22.0   |  0.65     Ca    9.2        ( 27 Oct 2019 00:53 )    TPro  6.9    /  Alb  4.1    /  TBili  4.0    /  DBili  x      /  AST  106    /  ALT  160    /  AlkPhos  137    ( 27 Oct 2019 00:53 )    LIVER FUNCTIONS - ( 27 Oct 2019 00:53 )  Alb: 4.1 g/dL / Pro: 6.9 g/dL / ALK PHOS: 137 U/L / ALT: 160 U/L / AST: 106 U/L / GGT: x               CAPILLARY BLOOD GLUCOSE        Urinalysis Basic - ( 27 Oct 2019 01:20 )    Color: Yellow / Appearance: Clear / S.020 / pH: x  Gluc: x / Ketone: Negative  / Bili: Negative / Urobili: Negative mg/dL   Blood: x / Protein: 15 mg/dL / Nitrite: Negative   Leuk Esterase: Negative / RBC: 3-5 /HPF / WBC 0-2   Sq Epi: x / Non Sq Epi: Occasional / Bacteria: Occasional  --------------------------------------------------------------------------------------------  IMAGING  US: Cholelithiasis. GBW thickening of 0.32cm, CBD 2.8cm. Poorly visualized CBD.

## 2019-10-27 NOTE — H&P ADULT - ATTENDING COMMENTS
gsp  avss  abd soft mild epigastric and luq pain  labs reviewed  u/s reviewed  dz: gallstone pancreatitis   admit to acs  npo  ivf  mrcp  will need lap angelita on this admission

## 2019-10-27 NOTE — H&P ADULT - ASSESSMENT
ASSESSMENT: Patient is a 58y old male with laboratory values suggesting gallstone pancreatitis, Lipase 2828, Direct bili 4.0. Imaging studies are somewhat inconclusive.     PLAN:    - Admit to [facility] floor under Dr. Love  - NPO/IVF  - MRCP  - Possible cholecystectomy in this admission  - pain control  - DVT ppx  - OOB/ambulate  - strict I/Os

## 2019-10-27 NOTE — ED ADULT NURSE REASSESSMENT NOTE - NS ED NURSE REASSESS COMMENT FT1
Pt is A&OX3. Pt denies pain, N/V/D. Pt VSS. Pt in NAD at this time. Pt moves all extremities equal and bilateral. Plan of care and wait time explained. Pt awaiting  admit bed. Safety maintained.

## 2019-10-27 NOTE — ED ADULT NURSE REASSESSMENT NOTE - NS ED NURSE REASSESS COMMENT FT1
pt. received from night RN. pt. a&ox3. pt. states he had abdominal pain but denies tawnya or discomfort at this time. pt. in no apparent distress, breathing even and unlabored. awaiting bed. informed on plan of care.

## 2019-10-28 ENCOUNTER — RESULT REVIEW (OUTPATIENT)
Age: 59
End: 2019-10-28

## 2019-10-28 ENCOUNTER — TRANSCRIPTION ENCOUNTER (OUTPATIENT)
Age: 59
End: 2019-10-28

## 2019-10-28 LAB
ALBUMIN SERPL ELPH-MCNC: 3.8 G/DL — SIGNIFICANT CHANGE UP (ref 3.3–5.2)
ALP SERPL-CCNC: 128 U/L — HIGH (ref 40–120)
ALT FLD-CCNC: 93 U/L — HIGH
ANION GAP SERPL CALC-SCNC: 13 MMOL/L — SIGNIFICANT CHANGE UP (ref 5–17)
AST SERPL-CCNC: 41 U/L — HIGH
BASOPHILS # BLD AUTO: 0.04 K/UL — SIGNIFICANT CHANGE UP (ref 0–0.2)
BASOPHILS NFR BLD AUTO: 0.5 % — SIGNIFICANT CHANGE UP (ref 0–2)
BILIRUB DIRECT SERPL-MCNC: 0.4 MG/DL — HIGH (ref 0–0.3)
BILIRUB INDIRECT FLD-MCNC: 0.8 MG/DL — SIGNIFICANT CHANGE UP (ref 0.2–1)
BILIRUB SERPL-MCNC: 1.2 MG/DL — SIGNIFICANT CHANGE UP (ref 0.4–2)
BUN SERPL-MCNC: 7 MG/DL — LOW (ref 8–20)
CALCIUM SERPL-MCNC: 9.1 MG/DL — SIGNIFICANT CHANGE UP (ref 8.6–10.2)
CHLORIDE SERPL-SCNC: 99 MMOL/L — SIGNIFICANT CHANGE UP (ref 98–107)
CO2 SERPL-SCNC: 24 MMOL/L — SIGNIFICANT CHANGE UP (ref 22–29)
CREAT SERPL-MCNC: 0.71 MG/DL — SIGNIFICANT CHANGE UP (ref 0.5–1.3)
EOSINOPHIL # BLD AUTO: 0.29 K/UL — SIGNIFICANT CHANGE UP (ref 0–0.5)
EOSINOPHIL NFR BLD AUTO: 3.4 % — SIGNIFICANT CHANGE UP (ref 0–6)
GLUCOSE SERPL-MCNC: 68 MG/DL — LOW (ref 70–115)
HCT VFR BLD CALC: 39 % — SIGNIFICANT CHANGE UP (ref 39–50)
HCV AB S/CO SERPL IA: 0.11 S/CO — SIGNIFICANT CHANGE UP (ref 0–0.99)
HCV AB SERPL-IMP: SIGNIFICANT CHANGE UP
HGB BLD-MCNC: 13.4 G/DL — SIGNIFICANT CHANGE UP (ref 13–17)
IMM GRANULOCYTES NFR BLD AUTO: 0.5 % — SIGNIFICANT CHANGE UP (ref 0–1.5)
LYMPHOCYTES # BLD AUTO: 1.4 K/UL — SIGNIFICANT CHANGE UP (ref 1–3.3)
LYMPHOCYTES # BLD AUTO: 16.4 % — SIGNIFICANT CHANGE UP (ref 13–44)
MAGNESIUM SERPL-MCNC: 1.9 MG/DL — SIGNIFICANT CHANGE UP (ref 1.6–2.6)
MCHC RBC-ENTMCNC: 32.1 PG — SIGNIFICANT CHANGE UP (ref 27–34)
MCHC RBC-ENTMCNC: 34.4 GM/DL — SIGNIFICANT CHANGE UP (ref 32–36)
MCV RBC AUTO: 93.3 FL — SIGNIFICANT CHANGE UP (ref 80–100)
MONOCYTES # BLD AUTO: 0.97 K/UL — HIGH (ref 0–0.9)
MONOCYTES NFR BLD AUTO: 11.4 % — SIGNIFICANT CHANGE UP (ref 2–14)
NEUTROPHILS # BLD AUTO: 5.78 K/UL — SIGNIFICANT CHANGE UP (ref 1.8–7.4)
NEUTROPHILS NFR BLD AUTO: 67.8 % — SIGNIFICANT CHANGE UP (ref 43–77)
PHOSPHATE SERPL-MCNC: 2.4 MG/DL — SIGNIFICANT CHANGE UP (ref 2.4–4.7)
PLATELET # BLD AUTO: 219 K/UL — SIGNIFICANT CHANGE UP (ref 150–400)
POTASSIUM SERPL-MCNC: 4.1 MMOL/L — SIGNIFICANT CHANGE UP (ref 3.5–5.3)
POTASSIUM SERPL-SCNC: 4.1 MMOL/L — SIGNIFICANT CHANGE UP (ref 3.5–5.3)
PROT SERPL-MCNC: 7 G/DL — SIGNIFICANT CHANGE UP (ref 6.6–8.7)
RBC # BLD: 4.18 M/UL — LOW (ref 4.2–5.8)
RBC # FLD: 12 % — SIGNIFICANT CHANGE UP (ref 10.3–14.5)
SODIUM SERPL-SCNC: 136 MMOL/L — SIGNIFICANT CHANGE UP (ref 135–145)
WBC # BLD: 8.52 K/UL — SIGNIFICANT CHANGE UP (ref 3.8–10.5)
WBC # FLD AUTO: 8.52 K/UL — SIGNIFICANT CHANGE UP (ref 3.8–10.5)

## 2019-10-28 PROCEDURE — 88304 TISSUE EXAM BY PATHOLOGIST: CPT | Mod: 26

## 2019-10-28 PROCEDURE — 47563 LAPARO CHOLECYSTECTOMY/GRAPH: CPT

## 2019-10-28 RX ORDER — OXYCODONE AND ACETAMINOPHEN 5; 325 MG/1; MG/1
2 TABLET ORAL EVERY 6 HOURS
Refills: 0 | Status: DISCONTINUED | OUTPATIENT
Start: 2019-10-28 | End: 2019-10-29

## 2019-10-28 RX ORDER — SODIUM CHLORIDE 9 MG/ML
1000 INJECTION, SOLUTION INTRAVENOUS
Refills: 0 | Status: DISCONTINUED | OUTPATIENT
Start: 2019-10-28 | End: 2019-10-28

## 2019-10-28 RX ORDER — CEFOTETAN DISODIUM 1 G
2 VIAL (EA) INJECTION ONCE
Refills: 0 | Status: DISCONTINUED | OUTPATIENT
Start: 2019-10-28 | End: 2019-10-28

## 2019-10-28 RX ORDER — ONDANSETRON 8 MG/1
4 TABLET, FILM COATED ORAL ONCE
Refills: 0 | Status: DISCONTINUED | OUTPATIENT
Start: 2019-10-28 | End: 2019-10-28

## 2019-10-28 RX ORDER — ONDANSETRON 8 MG/1
4 TABLET, FILM COATED ORAL EVERY 6 HOURS
Refills: 0 | Status: DISCONTINUED | OUTPATIENT
Start: 2019-10-28 | End: 2019-10-29

## 2019-10-28 RX ORDER — SENNA PLUS 8.6 MG/1
2 TABLET ORAL AT BEDTIME
Refills: 0 | Status: DISCONTINUED | OUTPATIENT
Start: 2019-10-28 | End: 2019-10-29

## 2019-10-28 RX ORDER — POLYETHYLENE GLYCOL 3350 17 G/17G
17 POWDER, FOR SOLUTION ORAL
Refills: 0 | Status: DISCONTINUED | OUTPATIENT
Start: 2019-10-28 | End: 2019-10-29

## 2019-10-28 RX ORDER — HEPARIN SODIUM 5000 [USP'U]/ML
5000 INJECTION INTRAVENOUS; SUBCUTANEOUS EVERY 8 HOURS
Refills: 0 | Status: DISCONTINUED | OUTPATIENT
Start: 2019-10-28 | End: 2019-10-29

## 2019-10-28 RX ORDER — POLYETHYLENE GLYCOL 3350 17 G/17G
17 POWDER, FOR SOLUTION ORAL ONCE
Refills: 0 | Status: COMPLETED | OUTPATIENT
Start: 2019-10-28 | End: 2019-10-28

## 2019-10-28 RX ORDER — SODIUM CHLORIDE 9 MG/ML
1000 INJECTION INTRAMUSCULAR; INTRAVENOUS; SUBCUTANEOUS
Refills: 0 | Status: DISCONTINUED | OUTPATIENT
Start: 2019-10-28 | End: 2019-10-28

## 2019-10-28 RX ORDER — HYDROMORPHONE HYDROCHLORIDE 2 MG/ML
0.5 INJECTION INTRAMUSCULAR; INTRAVENOUS; SUBCUTANEOUS
Refills: 0 | Status: DISCONTINUED | OUTPATIENT
Start: 2019-10-28 | End: 2019-10-28

## 2019-10-28 RX ORDER — HYDROMORPHONE HYDROCHLORIDE 2 MG/ML
0.5 INJECTION INTRAMUSCULAR; INTRAVENOUS; SUBCUTANEOUS EVERY 4 HOURS
Refills: 0 | Status: DISCONTINUED | OUTPATIENT
Start: 2019-10-28 | End: 2019-10-28

## 2019-10-28 RX ORDER — SIMETHICONE 80 MG/1
80 TABLET, CHEWABLE ORAL ONCE
Refills: 0 | Status: COMPLETED | OUTPATIENT
Start: 2019-10-28 | End: 2019-10-28

## 2019-10-28 RX ORDER — FENTANYL CITRATE 50 UG/ML
25 INJECTION INTRAVENOUS
Refills: 0 | Status: DISCONTINUED | OUTPATIENT
Start: 2019-10-28 | End: 2019-10-28

## 2019-10-28 RX ORDER — OXYCODONE AND ACETAMINOPHEN 5; 325 MG/1; MG/1
1 TABLET ORAL EVERY 6 HOURS
Refills: 0 | Status: DISCONTINUED | OUTPATIENT
Start: 2019-10-28 | End: 2019-10-29

## 2019-10-28 RX ADMIN — POLYETHYLENE GLYCOL 3350 17 GRAM(S): 17 POWDER, FOR SOLUTION ORAL at 23:46

## 2019-10-28 RX ADMIN — HEPARIN SODIUM 5000 UNIT(S): 5000 INJECTION INTRAVENOUS; SUBCUTANEOUS at 05:20

## 2019-10-28 RX ADMIN — SIMETHICONE 80 MILLIGRAM(S): 80 TABLET, CHEWABLE ORAL at 23:46

## 2019-10-28 NOTE — ASU PREOP CHECKLIST - TEMPERATURE IN CELSIUS (DEGREES C)
Problem: Infant Inpatient Plan of Care  Goal: Plan of Care Review  Outcome: Ongoing (interventions implemented as appropriate)    Normal body temperature in a servo-controlled radiant warmer.  With 3.0 ET, reintubated by NNSONALI Null with 3.5 ETT at 2000 and TA sent for culture.  Ventilator set-up increased to R 45, PIP 30, PEEP 6, PS 22. FiO2 to 40% then weaned to 38%. O2 Sat maintained within normal limits.   Had 1 bradycardia at 128 sec after coughing out some cloudy thick ET secretions. Suctioned hourly via ETT.  With 6.5 Fr OG reinserted after extubation/reintubation. Feeds with SSC 20 lamonte every 3 H at 8-11-2-5. Had 2 episode of emesis 1 ml and 0.5 ml approximately at 0035 and 0258. NNP Cicii informed. Five am feeding run over 45 minutes as agreed with NNP.  Had 1 small stool and urine output is 5.18 ml/kg/H in 12 H period.   Head circumference-31.5 cm at 0600  For EKG this morning.     Mom called at 7427, updated on the plan of care.          36.7

## 2019-10-28 NOTE — PROGRESS NOTE ADULT - ATTENDING COMMENTS
I have seen and examined  patient.  pain resolved,  LFT trending down bilirubin now normal  for lap cholecystectomy today with cholangiogram  '

## 2019-10-28 NOTE — DISCHARGE NOTE PROVIDER - HOSPITAL COURSE
58y Male with no significant PMH/PSH presented to ED with c/o of severe RUQ pain that started 3 days ago and is described as sharp and constant associated with chills. Pain is not associated with meals, fevers, and vomiting. Patient came to the ED almost a month ago with similar complaints and was diagnosed with cholelithiasis and given a follow up appointment with surgery for cholecystectomy as an outpatient. Patient has no other complaints. Last meal was yesterday in the afternoon and is having regular BM and passing flatus. Pt with laboratory values suggesting gallstone pancreatitis, Lipase 2828, Direct bili 4.0.  While admitted, Bilirubin downtrended to 1.5 and pt symptoms continue to resolve.  Pt without abdominal pain and was tolerating full liquids.  Pt taken for Laparoscopic Cholecystectomy with IOC which was negative with for choledocholithiasis.  The patient tolerated the procedure well and was transferred to the floor in stable condition.  The patient's diet was advanced and PO pain medications were ordered.  Once patient was ambulating well and voiding without difficulty, patient was found to be stable for discharge to home.  Pain was well-controlled at the time of discharge and the patient was tolerating a full diet.

## 2019-10-28 NOTE — DISCHARGE NOTE PROVIDER - NSFOLLOWUPCLINICS_GEN_ALL_ED_FT
[facility] Acute Care Surgery  Acute Care Surgery  [address] [place] [id number]  Phone: [phone]  Fax:   Follow Up Time:

## 2019-10-28 NOTE — PROGRESS NOTE ADULT - SUBJECTIVE AND OBJECTIVE BOX
INTERVAL HPI/OVERNIGHT EVENTS:    Patient still pending MRCP, no acute events overnight. His pain is improved. Labs are significant for downtrending LFTs. No new acute complaints at this time. Remains NPO. no fevers, chills, CP or SOB      MEDICATIONS  (STANDING):  heparin  Injectable 5000 Unit(s) SubCutaneous every 8 hours  lactated ringers. 1000 milliLiter(s) (150 mL/Hr) IV Continuous <Continuous>  ondansetron Injectable 4 milliGRAM(s) IV Push once    MEDICATIONS  (PRN):  ibuprofen  Tablet. 600 milliGRAM(s) Oral every 6 hours PRN Mild Pain (1 - 3)  morphine  - Injectable 2 milliGRAM(s) IV Push every 4 hours PRN Moderate Pain (4 - 6)  morphine  - Injectable 4 milliGRAM(s) IV Push every 4 hours PRN Severe Pain (7 - 10)  ondansetron Injectable 4 milliGRAM(s) IV Push every 6 hours PRN Nausea      Vital Signs Last 24 Hrs  T(C): 37 (27 Oct 2019 23:24), Max: 37 (27 Oct 2019 23:24)  T(F): 98.6 (27 Oct 2019 23:24), Max: 98.6 (27 Oct 2019 23:24)  HR: 78 (27 Oct 2019 23:24) (62 - 83)  BP: 124/72 (27 Oct 2019 23:24) (93/51 - 138/65)  BP(mean): 89 (27 Oct 2019 05:03) (89 - 89)  RR: 19 (27 Oct 2019 23:24) (17 - 19)  SpO2: 97% (27 Oct 2019 23:24) (95% - 97%)    Physical Exam:  General: NAD, Lying in bed comfortably  Neuro: A+Ox3  HEENT: EOMI, PERRLA, MMM  Cardio: RRR   Resp: Non labored breathing   GI/Abd: Soft, NT/ND, no rebound/guarding, no masses palpated  Musculoskeletal: All 4 extremities moving spontaneously, no limitations, no spinal tenderness or stepoffs    I&O's Detail      LABS:                        13.4   9.01  )-----------( 218      ( 27 Oct 2019 11:47 )             40.0     10    139  |  102  |  9.0  ----------------------------<  80  4.3   |  24.0  |  0.69    Ca    9.0      27 Oct 2019 11:47  Mg     1.9     10-27    TPro  6.6  /  Alb  3.7  /  TBili  1.5  /  DBili  x   /  AST  63<H>  /  ALT  124<H>  /  AlkPhos  129<H>  10-27      Urinalysis Basic - ( 27 Oct 2019 01:20 )    Color: Yellow / Appearance: Clear / S.020 / pH: x  Gluc: x / Ketone: Negative  / Bili: Negative / Urobili: Negative mg/dL   Blood: x / Protein: 15 mg/dL / Nitrite: Negative   Leuk Esterase: Negative / RBC: 3-5 /HPF / WBC 0-2   Sq Epi: x / Non Sq Epi: Occasional / Bacteria: Occasional        RADIOLOGY & ADDITIONAL STUDIES:

## 2019-10-28 NOTE — DISCHARGE NOTE PROVIDER - NSDCCPCAREPLAN_GEN_ALL_CORE_FT
PRINCIPAL DISCHARGE DIAGNOSIS  Diagnosis: Gallstone pancreatitis  Assessment and Plan of Treatment: Please call and make an appointment with Acute Care Surgery [facility] in 10-14 days after discharge. Also, please call and make an appointment with your primary care physician as per your usual schedule.   Activity: Avoid heavy lifting or strenuous activity until follow-up appointment.  Diet: May continue regular diet.  Medications: Please take all home medications as prescribed by your primary care doctor. Pain medication has been prescribed for you. Please, take it as it has been prescribed, do not drive or operate heavy machinery while taking narcotics.   Wound Care: Please keep surgical site clean and dry. You may shower, but do not bathe    If confusion, altered mental status, fever, chest pain, shortness of breath, severe or worsening pain, vomiting, change or worsening of medical status, please come back to the emergency room, and in case of emergency call [phone]

## 2019-10-28 NOTE — DISCHARGE NOTE PROVIDER - CARE PROVIDER_API CALL
Arvind Pickard)  Surgery  [address], [address]  [place] [address]  Phone: [phone]  Fax: [phone]  Follow Up Time:

## 2019-10-28 NOTE — BRIEF OPERATIVE NOTE - OPERATION/FINDINGS
- mild inflammation of gallbladder, with cholelithiasis  - IOC performed with no evidence of filling defect in CBD; contrast visualized within duodenum  - cystic duct and artery identified, clipped and transected; endo-loop applied on cystic duct

## 2019-10-28 NOTE — PROGRESS NOTE ADULT - ASSESSMENT
ASSESSMENT: Patient is a 58y old male with laboratory values suggesting gallstone pancreatitis, Lipase 2828, Direct bili 4.0 downtrended to 1.5. Imaging studies are somewhat inconclusive.     PLAN:    - Admit to [facility] floor under Dr. oLve  - NPO/IVF  - MRCP, continue to trend LFTs  - Possible cholecystectomy in this admission  - pain control  - DVT ppx  - OOB/ambulate  - strict I/Os ASSESSMENT: Patient is a 58y old male with laboratory values suggesting gallstone pancreatitis, Lipase 2828, Direct bili 4.0 downtrended to 1.5. Imaging studies are somewhat inconclusive.     PLAN:    - Possible cholecystectomy in this admission  - pain control  - DVT ppx  - OOB/ambulate  - strict I/Os

## 2019-10-29 ENCOUNTER — TRANSCRIPTION ENCOUNTER (OUTPATIENT)
Age: 59
End: 2019-10-29

## 2019-10-29 VITALS
DIASTOLIC BLOOD PRESSURE: 68 MMHG | SYSTOLIC BLOOD PRESSURE: 123 MMHG | HEART RATE: 73 BPM | TEMPERATURE: 98 F | RESPIRATION RATE: 18 BRPM | OXYGEN SATURATION: 94 %

## 2019-10-29 LAB
ALBUMIN SERPL ELPH-MCNC: 4.2 G/DL — SIGNIFICANT CHANGE UP (ref 3.3–5.2)
ALP SERPL-CCNC: 125 U/L — HIGH (ref 40–120)
ALT FLD-CCNC: 91 U/L — HIGH
AST SERPL-CCNC: 44 U/L — HIGH
BILIRUB DIRECT SERPL-MCNC: 0.3 MG/DL — SIGNIFICANT CHANGE UP (ref 0–0.3)
BILIRUB INDIRECT FLD-MCNC: 0.5 MG/DL — SIGNIFICANT CHANGE UP (ref 0.2–1)
BILIRUB SERPL-MCNC: 0.8 MG/DL — SIGNIFICANT CHANGE UP (ref 0.4–2)
PROT SERPL-MCNC: 7.3 G/DL — SIGNIFICANT CHANGE UP (ref 6.6–8.7)

## 2019-10-29 PROCEDURE — 80048 BASIC METABOLIC PNL TOTAL CA: CPT

## 2019-10-29 PROCEDURE — 88304 TISSUE EXAM BY PATHOLOGIST: CPT

## 2019-10-29 PROCEDURE — 99285 EMERGENCY DEPT VISIT HI MDM: CPT | Mod: 25

## 2019-10-29 PROCEDURE — 86900 BLOOD TYPING SEROLOGIC ABO: CPT

## 2019-10-29 PROCEDURE — 83690 ASSAY OF LIPASE: CPT

## 2019-10-29 PROCEDURE — 81001 URINALYSIS AUTO W/SCOPE: CPT

## 2019-10-29 PROCEDURE — 84100 ASSAY OF PHOSPHORUS: CPT

## 2019-10-29 PROCEDURE — 86850 RBC ANTIBODY SCREEN: CPT

## 2019-10-29 PROCEDURE — 71045 X-RAY EXAM CHEST 1 VIEW: CPT

## 2019-10-29 PROCEDURE — 36415 COLL VENOUS BLD VENIPUNCTURE: CPT

## 2019-10-29 PROCEDURE — 80076 HEPATIC FUNCTION PANEL: CPT

## 2019-10-29 PROCEDURE — 85027 COMPLETE CBC AUTOMATED: CPT

## 2019-10-29 PROCEDURE — 76000 FLUOROSCOPY <1 HR PHYS/QHP: CPT

## 2019-10-29 PROCEDURE — 99024 POSTOP FOLLOW-UP VISIT: CPT

## 2019-10-29 PROCEDURE — 86803 HEPATITIS C AB TEST: CPT

## 2019-10-29 PROCEDURE — 93005 ELECTROCARDIOGRAM TRACING: CPT

## 2019-10-29 PROCEDURE — 83615 LACTATE (LD) (LDH) ENZYME: CPT

## 2019-10-29 PROCEDURE — 86901 BLOOD TYPING SEROLOGIC RH(D): CPT

## 2019-10-29 PROCEDURE — 76705 ECHO EXAM OF ABDOMEN: CPT

## 2019-10-29 PROCEDURE — 96374 THER/PROPH/DIAG INJ IV PUSH: CPT

## 2019-10-29 PROCEDURE — 83735 ASSAY OF MAGNESIUM: CPT

## 2019-10-29 PROCEDURE — 80053 COMPREHEN METABOLIC PANEL: CPT

## 2019-10-29 RX ADMIN — OXYCODONE AND ACETAMINOPHEN 1 TABLET(S): 5; 325 TABLET ORAL at 06:41

## 2019-10-29 RX ADMIN — POLYETHYLENE GLYCOL 3350 17 GRAM(S): 17 POWDER, FOR SOLUTION ORAL at 05:46

## 2019-10-29 RX ADMIN — OXYCODONE AND ACETAMINOPHEN 1 TABLET(S): 5; 325 TABLET ORAL at 12:00

## 2019-10-29 RX ADMIN — OXYCODONE AND ACETAMINOPHEN 2 TABLET(S): 5; 325 TABLET ORAL at 01:46

## 2019-10-29 RX ADMIN — OXYCODONE AND ACETAMINOPHEN 2 TABLET(S): 5; 325 TABLET ORAL at 00:46

## 2019-10-29 NOTE — PROGRESS NOTE ADULT - SUBJECTIVE AND OBJECTIVE BOX
HPI/OVERNIGHT EVENTS: no acute events overnight. Pain well controlled, tolerating diet, voiding spontaneously. reports baseline abdominal distention that has improved after OR. bowel regimen started. denies f/c/sob/n/v    MEDICATIONS  (STANDING):  heparin  Injectable 5000 Unit(s) SubCutaneous every 8 hours  polyethylene glycol 3350 17 Gram(s) Oral two times a day  senna 2 Tablet(s) Oral at bedtime    MEDICATIONS  (PRN):  ondansetron Injectable 4 milliGRAM(s) IV Push every 6 hours PRN Nausea  oxycodone    5 mG/acetaminophen 325 mG 1 Tablet(s) Oral every 6 hours PRN Mild Pain (1 - 3)  oxycodone    5 mG/acetaminophen 325 mG 2 Tablet(s) Oral every 6 hours PRN Moderate Pain (4 - 6)      Vital Signs Last 24 Hrs  T(C): 36.7 (29 Oct 2019 00:24), Max: 37.1 (28 Oct 2019 11:17)  T(F): 98 (29 Oct 2019 00:24), Max: 98.7 (28 Oct 2019 11:17)  HR: 91 (29 Oct 2019 00:24) (71 - 94)  BP: 133/72 (29 Oct 2019 00:24) (94/57 - 141/62)  BP(mean): --  RR: 18 (29 Oct 2019 00:24) (13 - 22)  SpO2: 93% (29 Oct 2019 00:24) (93% - 100%)    General: NAD, resting comfortably in bed  Pulmonary: Nonlabored breathing, no respiratory distress  Cardiovascular: NSR  Abdominal: soft, appropriately tender, distended and tympanitic on percussion. incisions c/d/i  Extremities: WWP      I&O's Detail    27 Oct 2019 07:01  -  28 Oct 2019 07:00  --------------------------------------------------------  IN:    lactated ringers.: 1200 mL  Total IN: 1200 mL    OUT:  Total OUT: 0 mL    Total NET: 1200 mL      28 Oct 2019 07:01  -  29 Oct 2019 01:13  --------------------------------------------------------  IN:    sodium chloride 0.9%: 225 mL  Total IN: 225 mL    OUT:    Voided: 650 mL  Total OUT: 650 mL    Total NET: -425 mL          LABS:                        13.4   8.52  )-----------( 219      ( 28 Oct 2019 06:24 )             39.0     10-28    136  |  99  |  7.0<L>  ----------------------------<  68<L>  4.1   |  24.0  |  0.71    Ca    9.1      28 Oct 2019 06:24  Phos  2.4     10-28  Mg     1.9     10-28    TPro  7.0  /  Alb  3.8  /  TBili  1.2  /  DBili  0.4<H>  /  AST  41<H>  /  ALT  93<H>  /  AlkPhos  128<H>  10-28      Urinalysis Basic - ( 27 Oct 2019 01:20 )    Color: Yellow / Appearance: Clear / S.020 / pH: x  Gluc: x / Ketone: Negative  / Bili: Negative / Urobili: Negative mg/dL   Blood: x / Protein: 15 mg/dL / Nitrite: Negative   Leuk Esterase: Negative / RBC: 3-5 /HPF / WBC 0-2   Sq Epi: x / Non Sq Epi: Occasional / Bacteria: Occasional

## 2019-10-29 NOTE — PROGRESS NOTE ADULT - ASSESSMENT
The patient is a 58y Male who is now several hours post-op from a lap angelita w/ IOC    Plan:  - Pain control as needed  - bowel regimen  - DVT ppx  - OOB and ambulating as tolerated  - F/u AM LFTs  - Likely dc today

## 2019-10-29 NOTE — DISCHARGE NOTE NURSING/CASE MANAGEMENT/SOCIAL WORK - PATIENT PORTAL LINK FT
You can access the FollowMyHealth Patient Portal offered by [facility] by registering at the following website: [email or web address]. By joining [employer]’s FollowMyHealth portal, you will also be able to view your health information using other applications (apps) compatible with our system.

## 2019-11-01 LAB — SURGICAL PATHOLOGY STUDY: SIGNIFICANT CHANGE UP

## 2019-11-07 ENCOUNTER — APPOINTMENT (OUTPATIENT)
Dept: TRAUMA SURGERY | Facility: CLINIC | Age: 59
End: 2019-11-07
Payer: COMMERCIAL

## 2019-11-07 VITALS
HEIGHT: 64 IN | WEIGHT: 165 LBS | TEMPERATURE: 97.6 F | RESPIRATION RATE: 16 BRPM | SYSTOLIC BLOOD PRESSURE: 109 MMHG | DIASTOLIC BLOOD PRESSURE: 69 MMHG | BODY MASS INDEX: 28.17 KG/M2 | OXYGEN SATURATION: 98 % | HEART RATE: 73 BPM

## 2019-11-07 DIAGNOSIS — K81.2 ACUTE CHOLECYSTITIS WITH CHRONIC CHOLECYSTITIS: ICD-10-CM

## 2019-11-07 PROCEDURE — 99024 POSTOP FOLLOW-UP VISIT: CPT

## 2019-11-07 NOTE — PLAN
[FreeTextEntry1] : Adequate evolution after lap cholecystectomy.\par no acolia, no coluria\par path reviewed: acute on chronic cholecystitis\par he will like to be evaluate for hernia repair in the future.\par RTO for hernia discussion in 4-6 months\par

## 2019-11-07 NOTE — HISTORY OF PRESENT ILLNESS
[de-identified] : lap cholecystectomy  10/28/19.\par tolerating diet. pain under control, some discomfort at umbilical port .\par no fevers no chills\par

## 2019-11-07 NOTE — PHYSICAL EXAM
[de-identified] : NAd [de-identified] : well healed scars, umbilical hernia  with well healed port site.\par \par

## 2019-12-02 ENCOUNTER — APPOINTMENT (OUTPATIENT)
Dept: CARDIOLOGY | Facility: CLINIC | Age: 59
End: 2019-12-02
Payer: COMMERCIAL

## 2019-12-02 VITALS
WEIGHT: 163 LBS | SYSTOLIC BLOOD PRESSURE: 108 MMHG | BODY MASS INDEX: 27.83 KG/M2 | HEIGHT: 64 IN | HEART RATE: 86 BPM | DIASTOLIC BLOOD PRESSURE: 62 MMHG | OXYGEN SATURATION: 96 %

## 2019-12-02 DIAGNOSIS — E56.9 VITAMIN DEFICIENCY, UNSPECIFIED: ICD-10-CM

## 2019-12-02 PROCEDURE — 99214 OFFICE O/P EST MOD 30 MIN: CPT

## 2019-12-02 NOTE — PHYSICAL EXAM
[facility].   [General Appearance - Well Developed] : well developed [General Appearance - Well Nourished] : well nourished [Normal Conjunctiva] : the conjunctiva exhibited no abnormalities [Normal Oral Mucosa] : normal oral mucosa [Auscultation Breath Sounds / Voice Sounds] : lungs were clear to auscultation bilaterally [Heart Sounds] : normal S1 and S2 [Abdomen Soft] : soft [Bowel Sounds] : normal bowel sounds [Abnormal Walk] : normal gait [] : no rash [FreeTextEntry1] : No JVD

## 2019-12-02 NOTE — HISTORY OF PRESENT ILLNESS
[FreeTextEntry1] : The patient is a 58-year-old  male with a history of a VSD repair at age 15 presents for a f/u visit. Patient has Cholecystectomy without any problem. Patient denies any chest pain, dyspnea, palpitations, syncope. Patient has no complaints except for symptoms related to his gallbladder.\par \par Patient has no history of diabetes, no history of hypertension, no history of prior coronary artery disease, no history of CHF. No history of cardiac arrhythmias.\par Patients echo showed Dilated aortic root and MVP with MR.\par \par Patients aunt in the room

## 2019-12-02 NOTE — ASSESSMENT
[FreeTextEntry1] : EKG- NSR, RBBB\par Stress Test 10/2019- Negative stress test.\par ECHO 10/2019- LVEF 50-55%. Mild Anterior leaflet MVP with Mild to Moderate MR. Aortic Root measuring 4.4 cm.\par \par Assessment:\par 1. MVP with MR\par 2. S/P VSD Repair at age 15 at [facility]\par 3. Aortic Root Dilatation- 4.4 cm\par \par Recommendations:\par 1. NBA\par 2. Chest CT with Contrast for Dilated Aortic Root\par 3. F/U in 3 months

## 2019-12-07 ENCOUNTER — APPOINTMENT (OUTPATIENT)
Age: 59
End: 2019-12-07
Payer: COMMERCIAL

## 2019-12-07 ENCOUNTER — OUTPATIENT (OUTPATIENT)
Dept: OUTPATIENT SERVICES | Facility: HOSPITAL | Age: 59
LOS: 1 days | End: 2019-12-07
Payer: COMMERCIAL

## 2019-12-07 DIAGNOSIS — I38 ENDOCARDITIS, VALVE UNSPECIFIED: Chronic | ICD-10-CM

## 2019-12-07 DIAGNOSIS — I34.0 NONRHEUMATIC MITRAL (VALVE) INSUFFICIENCY: ICD-10-CM

## 2019-12-07 PROCEDURE — 71260 CT THORAX DX C+: CPT

## 2019-12-07 PROCEDURE — 71260 CT THORAX DX C+: CPT | Mod: 26

## 2019-12-10 ENCOUNTER — TRANSCRIPTION ENCOUNTER (OUTPATIENT)
Age: 59
End: 2019-12-10

## 2019-12-10 ENCOUNTER — OUTPATIENT (OUTPATIENT)
Dept: OUTPATIENT SERVICES | Facility: HOSPITAL | Age: 59
LOS: 1 days | End: 2019-12-10
Payer: COMMERCIAL

## 2019-12-10 VITALS
RESPIRATION RATE: 16 BRPM | HEART RATE: 64 BPM | SYSTOLIC BLOOD PRESSURE: 102 MMHG | DIASTOLIC BLOOD PRESSURE: 63 MMHG | OXYGEN SATURATION: 98 %

## 2019-12-10 VITALS
RESPIRATION RATE: 20 BRPM | OXYGEN SATURATION: 99 % | DIASTOLIC BLOOD PRESSURE: 77 MMHG | TEMPERATURE: 98 F | HEART RATE: 74 BPM | SYSTOLIC BLOOD PRESSURE: 137 MMHG

## 2019-12-10 DIAGNOSIS — I34.1 NONRHEUMATIC MITRAL (VALVE) PROLAPSE: ICD-10-CM

## 2019-12-10 DIAGNOSIS — Z90.49 ACQUIRED ABSENCE OF OTHER SPECIFIED PARTS OF DIGESTIVE TRACT: Chronic | ICD-10-CM

## 2019-12-10 DIAGNOSIS — Q21.0 VENTRICULAR SEPTAL DEFECT: ICD-10-CM

## 2019-12-10 DIAGNOSIS — I38 ENDOCARDITIS, VALVE UNSPECIFIED: Chronic | ICD-10-CM

## 2019-12-10 DIAGNOSIS — I34.0 NONRHEUMATIC MITRAL (VALVE) INSUFFICIENCY: ICD-10-CM

## 2019-12-10 LAB
ANION GAP SERPL CALC-SCNC: 13 MMOL/L — SIGNIFICANT CHANGE UP (ref 5–17)
APTT BLD: 28 SEC — SIGNIFICANT CHANGE UP (ref 27.5–36.3)
BUN SERPL-MCNC: 13 MG/DL — SIGNIFICANT CHANGE UP (ref 8–20)
CALCIUM SERPL-MCNC: 9.3 MG/DL — SIGNIFICANT CHANGE UP (ref 8.6–10.2)
CHLORIDE SERPL-SCNC: 100 MMOL/L — SIGNIFICANT CHANGE UP (ref 98–107)
CO2 SERPL-SCNC: 26 MMOL/L — SIGNIFICANT CHANGE UP (ref 22–29)
CREAT SERPL-MCNC: 0.88 MG/DL — SIGNIFICANT CHANGE UP (ref 0.5–1.3)
GLUCOSE SERPL-MCNC: 99 MG/DL — SIGNIFICANT CHANGE UP (ref 70–115)
HCT VFR BLD CALC: 41.9 % — SIGNIFICANT CHANGE UP (ref 39–50)
HGB BLD-MCNC: 14.1 G/DL — SIGNIFICANT CHANGE UP (ref 13–17)
INR BLD: 1.02 RATIO — SIGNIFICANT CHANGE UP (ref 0.88–1.16)
MCHC RBC-ENTMCNC: 31.3 PG — SIGNIFICANT CHANGE UP (ref 27–34)
MCHC RBC-ENTMCNC: 33.7 GM/DL — SIGNIFICANT CHANGE UP (ref 32–36)
MCV RBC AUTO: 92.9 FL — SIGNIFICANT CHANGE UP (ref 80–100)
PLATELET # BLD AUTO: 241 K/UL — SIGNIFICANT CHANGE UP (ref 150–400)
POTASSIUM SERPL-MCNC: 4 MMOL/L — SIGNIFICANT CHANGE UP (ref 3.5–5.3)
POTASSIUM SERPL-SCNC: 4 MMOL/L — SIGNIFICANT CHANGE UP (ref 3.5–5.3)
PROTHROM AB SERPL-ACNC: 11.7 SEC — SIGNIFICANT CHANGE UP (ref 10–12.9)
RBC # BLD: 4.51 M/UL — SIGNIFICANT CHANGE UP (ref 4.2–5.8)
RBC # FLD: 12.1 % — SIGNIFICANT CHANGE UP (ref 10.3–14.5)
SODIUM SERPL-SCNC: 139 MMOL/L — SIGNIFICANT CHANGE UP (ref 135–145)
WBC # BLD: 4.93 K/UL — SIGNIFICANT CHANGE UP (ref 3.8–10.5)
WBC # FLD AUTO: 4.93 K/UL — SIGNIFICANT CHANGE UP (ref 3.8–10.5)

## 2019-12-10 PROCEDURE — 85027 COMPLETE CBC AUTOMATED: CPT

## 2019-12-10 PROCEDURE — 36415 COLL VENOUS BLD VENIPUNCTURE: CPT

## 2019-12-10 PROCEDURE — 93005 ELECTROCARDIOGRAM TRACING: CPT

## 2019-12-10 PROCEDURE — 93320 DOPPLER ECHO COMPLETE: CPT | Mod: 26

## 2019-12-10 PROCEDURE — 93320 DOPPLER ECHO COMPLETE: CPT

## 2019-12-10 PROCEDURE — 85730 THROMBOPLASTIN TIME PARTIAL: CPT

## 2019-12-10 PROCEDURE — 93312 ECHO TRANSESOPHAGEAL: CPT

## 2019-12-10 PROCEDURE — 93010 ELECTROCARDIOGRAM REPORT: CPT

## 2019-12-10 PROCEDURE — 80048 BASIC METABOLIC PNL TOTAL CA: CPT

## 2019-12-10 PROCEDURE — 93312 ECHO TRANSESOPHAGEAL: CPT | Mod: 26

## 2019-12-10 PROCEDURE — 85610 PROTHROMBIN TIME: CPT

## 2019-12-10 PROCEDURE — 93325 DOPPLER ECHO COLOR FLOW MAPG: CPT

## 2019-12-10 PROCEDURE — 93325 DOPPLER ECHO COLOR FLOW MAPG: CPT | Mod: 26

## 2019-12-10 RX ORDER — SODIUM CHLORIDE 9 MG/ML
1000 INJECTION INTRAMUSCULAR; INTRAVENOUS; SUBCUTANEOUS
Refills: 0 | Status: DISCONTINUED | OUTPATIENT
Start: 2019-12-10 | End: 2020-01-03

## 2019-12-10 RX ORDER — CHOLECALCIFEROL (VITAMIN D3) 125 MCG
1 CAPSULE ORAL
Qty: 0 | Refills: 0 | DISCHARGE

## 2019-12-10 RX ADMIN — SODIUM CHLORIDE 30 MILLILITER(S): 9 INJECTION INTRAMUSCULAR; INTRAVENOUS; SUBCUTANEOUS at 10:15

## 2019-12-10 NOTE — DISCHARGE NOTE PROVIDER - CARE PROVIDER_API CALL
Conner Hernandez)  Cardiology; Cardiovascular Disease; Internal Medicine  [address] [place] [address]  Phone: [phone]  Fax: [phone]  Follow Up Time: 2 weeks

## 2019-12-10 NOTE — DISCHARGE NOTE PROVIDER - HOSPITAL COURSE
The patient is a 58-year-old  male with a history of a VSD repair at age 15 presents for a f/u visit. Patient has Cholecystectomy without any problem. Patient denies any chest pain, dyspnea, palpitations, syncope. Patient has no complaints except for symptoms related to his gallbladder.        Patient has no history of diabetes, no history of hypertension, no history of prior coronary artery disease, no history of CHF. No history of cardiac arrhythmias.    Patients echo showed Dilated aortic root and MVP with MR.        T(C): 36.6 (12-10-19 @ 09:38), Max: 36.6 (12-10-19 @ 08:30)    HR: 74 (12-10-19 @ 11:15) (74 - 74)    BP: 97/56 (12-10-19 @ 11:15) (97/56 - 137/77)    RR: 20 (12-10-19 @ 11:15) (18 - 20)    SpO2: 95% (12-10-19 @ 11:15) (95% - 99%)            Physical Exam:    · Constitutional    Well-developed, well nourished    · Eyes    EOMI; PERRL; no drainage or redness    · ENMT    No oral lesions; no gross abnormalities    · Neck    No bruits; no thyromegaly or nodules    · Breasts    No masses; no nipple discharge    · Back    No deformity or limitation of movement    · Respiratory    Breath Sounds equal & clear to percussion & auscultation, no accessory muscle use    · Cardiovascular    Regular rate & rhythm, normal S1, S2; no murmurs, gallops or rubs; no S3, S4    · Gastrointestinal    Soft, non-tender, no hepatosplenomegaly, normal bowel sounds    · Genitourinary    not examined    · Rectal    not examined    · Extremities    No cyanosis, clubbing or edema    · Vascular    Equal and normal pulses (carotid, femoral, dorsalis pedis)    · Neurological    Alert & oriented; no sensory, motor or coordination deficits, normal reflexes    · Skin    No lesions; no rash    · Lymph Nodes    No lymphadedenopathy    · Musculoskeletal    No joint pain, swelling or deformity; no limitation of movement    · Psychiatric    Affect and characteristics of appearance, verbalizations, behaviors are appropriate    12-10        139  |  100  |  13.0    ----------------------------<  99    4.0   |  26.0  |  0.88        Ca    9.3      10 Dec 2019 09:14                                14.1     4.93  )-----------( 241      ( 10 Dec 2019 09:14 )               41.9         S/P NBA    Severe MR     MVP    NPO until 1220    When tolerating po fluids ok to d/c home after 1300.

## 2019-12-10 NOTE — CHART NOTE - NSCHARTNOTEFT_GEN_A_CORE
NBA Procedure note.    NBA procedure risks and benefits were discussed with the patient, informed consent obtained.  Indication:     Patient tolerated the procedure well    NBA Findings:  1. Myxomatous mitral valve with A2, A3 and P3 prolapse with atleasat 3+ MR  2. Normal LV and RV size and function  3. Mild to Moderate TR  4. Normal Aortic and Pulmonary Valve.  5. No pericardial effusion  6. No PFO  7. No intracavitary mass or thrombus            Please refer to report for full details    Results communicated to the patient/family, physicians involved in his care

## 2019-12-10 NOTE — DISCHARGE NOTE NURSING/CASE MANAGEMENT/SOCIAL WORK - PATIENT PORTAL LINK FT
You can access the FollowMyHealth Patient Portal offered by [facility] by registering at the following website: [email or web address]. By joining [employer]’s FollowMyHealth portal, you will also be able to view your health information using other applications (apps) compatible with our system.

## 2019-12-10 NOTE — DISCHARGE NOTE PROVIDER - NSDCCPCAREPLAN_GEN_ALL_CORE_FT
PRINCIPAL DISCHARGE DIAGNOSIS  Diagnosis: Severe mitral regurgitation  Assessment and Plan of Treatment:

## 2020-01-02 PROBLEM — Q21.0 VENTRICULAR SEPTAL DEFECT: Chronic | Status: ACTIVE | Noted: 2019-12-10

## 2020-01-27 ENCOUNTER — APPOINTMENT (OUTPATIENT)
Dept: VASCULAR SURGERY | Facility: CLINIC | Age: 60
End: 2020-01-27
Payer: COMMERCIAL

## 2020-01-27 VITALS
SYSTOLIC BLOOD PRESSURE: 126 MMHG | HEIGHT: 64 IN | RESPIRATION RATE: 16 BRPM | BODY MASS INDEX: 28.34 KG/M2 | OXYGEN SATURATION: 94 % | TEMPERATURE: 98 F | HEART RATE: 71 BPM | WEIGHT: 166 LBS | DIASTOLIC BLOOD PRESSURE: 72 MMHG

## 2020-01-27 PROCEDURE — 99203 OFFICE O/P NEW LOW 30 MIN: CPT

## 2020-01-27 PROCEDURE — 93978 VASCULAR STUDY: CPT

## 2020-01-27 PROCEDURE — 99213 OFFICE O/P EST LOW 20 MIN: CPT

## 2020-02-07 ENCOUNTER — APPOINTMENT (OUTPATIENT)
Dept: CARDIOLOGY | Facility: CLINIC | Age: 60
End: 2020-02-07
Payer: COMMERCIAL

## 2020-02-07 VITALS
BODY MASS INDEX: 27.66 KG/M2 | OXYGEN SATURATION: 97 % | HEART RATE: 75 BPM | DIASTOLIC BLOOD PRESSURE: 58 MMHG | WEIGHT: 162 LBS | SYSTOLIC BLOOD PRESSURE: 116 MMHG | HEIGHT: 64 IN

## 2020-02-07 PROCEDURE — 99214 OFFICE O/P EST MOD 30 MIN: CPT

## 2020-02-07 NOTE — HISTORY OF PRESENT ILLNESS
[FreeTextEntry1] : The patient is a 58-year-old  male with a history of a VSD repair at age 15 presents for a f/u visit. Patient denies any chest pain, dyspnea, palpitations, syncope. Patient is c/o fatigue. \par \par Patient has no history of diabetes, no history of hypertension, no history of prior coronary artery disease, no history of CHF. No history of cardiac arrhythmias.\par Patients echo showed Dilated aortic root and MVP with MR.\par Patient had Chest CT with contrast and NBA, he is here to discuss test results. During NBA, Anaesthesiologist was concerned about the patient having possible OLIVER\par \par Patients aunt in the room

## 2020-02-07 NOTE — ASSESSMENT
[FreeTextEntry1] : EKG- NSR, RBBB\par Stress Test 10/2019- Negative stress test.\par ECHO 10/2019- LVEF 50-55%. Mild Anterior leaflet MVP with Mild to Moderate MR. Aortic Root measuring 4.4 cm.\par \par Chest CT 12/7/2019- Aortic Root 4.5 cm\par NBA 12/10/2019- LVEF 50-55%. Severe MR with A2, A3 and P3 prolapse with 3-4+ Mitral regurgitation.\par \par Assessment:\par 1. MVP with Severe MR\par 2. S/P VSD Repair at age 15 at [facility]\par 3. Aortic Root Dilatation- 4.5 cm\par 4. OLIVER\par \par Recommendations:\par Test results were discussed with the patient and his aunt\par Patient was reluctant to get evaluation for OLIVER because he has friends who use CPAP, he is skeptical about using it, finally he agreed to go for pulmonary evaluation- Pulmonary Consult\par Start Toprol XL 25 mg daily\par CT Surgery Consult for Dilated Aorta and Severe MR\par \par F/U in 3 months

## 2020-02-07 NOTE — PHYSICAL EXAM
[General Appearance - Well Developed] : well developed [General Appearance - Well Nourished] : well nourished [Normal Conjunctiva] : the conjunctiva exhibited no abnormalities [Normal Oral Mucosa] : normal oral mucosa [Heart Sounds] : normal S1 and S2 [Auscultation Breath Sounds / Voice Sounds] : lungs were clear to auscultation bilaterally [Bowel Sounds] : normal bowel sounds [Abnormal Walk] : normal gait [Abdomen Soft] : soft [] : no rash [FreeTextEntry1] : No JVD

## 2020-02-11 ENCOUNTER — APPOINTMENT (OUTPATIENT)
Dept: PULMONOLOGY | Facility: CLINIC | Age: 60
End: 2020-02-11
Payer: COMMERCIAL

## 2020-02-11 VITALS
SYSTOLIC BLOOD PRESSURE: 116 MMHG | DIASTOLIC BLOOD PRESSURE: 64 MMHG | RESPIRATION RATE: 16 BRPM | HEIGHT: 65 IN | OXYGEN SATURATION: 98 % | HEART RATE: 80 BPM | WEIGHT: 164 LBS | BODY MASS INDEX: 27.32 KG/M2

## 2020-02-11 PROCEDURE — 99204 OFFICE O/P NEW MOD 45 MIN: CPT

## 2020-02-11 NOTE — CONSULT LETTER
[Consult Letter:] : I had the pleasure of evaluating your patient, [unfilled]. [Dear  ___] : Dear  [unfilled], [Please see my note below.] : Please see my note below. [Consult Closing:] : Thank you very much for allowing me to participate in the care of this patient.  If you have any questions, please do not hesitate to contact me. [Sincerely,] : Sincerely, [FreeTextEntry3] : Briseida Wood MD FCCP\par D-ABSM\par ABIM board certified in  Pulmonary diseases, Sleep medicine\par Internal medicine\par

## 2020-02-11 NOTE — ASSESSMENT
[FreeTextEntry1] : The patient has signs and symptoms of obstructive sleep apnea. This was demonstrated during some sedation for a transesophageal echocardiogram. He has micrognathia and a crowded pharynx. A sleep study has been ordered and I will see him back after that. If sleep apnea is present he would be a good oral appliance candidate and I will make the appropriate referrals.

## 2020-02-11 NOTE — PHYSICAL EXAM
[No Acute Distress] : no acute distress [Enlarged Base of the Tongue] : enlarged base of the tongue [III] : Mallampati Class: III [Micrognathia] : micrognathia [No Neck Mass] : no neck mass [Normal Appearance] : normal appearance [Normal Rate/Rhythm] : normal rate/rhythm [Normal S1, S2] : normal s1, s2 [No Resp Distress] : no resp distress [No Murmurs] : no murmurs [Clear to Auscultation Bilaterally] : clear to auscultation bilaterally [Benign] : benign [No Abnormalities] : no abnormalities [Normal Gait] : normal gait [No Cyanosis] : no cyanosis [No Clubbing] : no clubbing [No Edema] : no edema [FROM] : FROM [Normal Color/ Pigmentation] : normal color/ pigmentation [No Focal Deficits] : no focal deficits [Oriented x3] : oriented x3 [TextBox_11] : Lateral ridging to tongue [Normal Affect] : normal affect 102

## 2020-02-11 NOTE — HISTORY OF PRESENT ILLNESS
[TextBox_4] : The patient is a 59-year-old male with a history of VSD repair as a child and currently with dilated aortic root and mitral valve prolapse with mitral regurgitation. He had a recent transesophageal echocardiogram. The anesthesiologist noted obstructive apneas during the procedure and the patient was referred here. The patient does snore. He gets into bed generally around 10 PM and falls asleep within 30 minutes. He has nocturia times 2 at night. He is up at 6 AM sometimes feeling unrefreshed. He cannot sleep on his back because of shortness of breath. During the day he drinks a significant amount of coffee and reports excessive sleepiness especially in the afternoons. His mother snored heavily.

## 2020-02-20 ENCOUNTER — APPOINTMENT (OUTPATIENT)
Dept: THORACIC SURGERY | Facility: CLINIC | Age: 60
End: 2020-02-20

## 2020-02-20 ENCOUNTER — APPOINTMENT (OUTPATIENT)
Dept: CARDIOTHORACIC SURGERY | Facility: CLINIC | Age: 60
End: 2020-02-20
Payer: COMMERCIAL

## 2020-02-20 VITALS
HEART RATE: 77 BPM | OXYGEN SATURATION: 97 % | RESPIRATION RATE: 16 BRPM | SYSTOLIC BLOOD PRESSURE: 125 MMHG | HEIGHT: 65 IN | BODY MASS INDEX: 27.32 KG/M2 | DIASTOLIC BLOOD PRESSURE: 69 MMHG | WEIGHT: 164 LBS

## 2020-02-20 DIAGNOSIS — R93.1 ABNORMAL FINDINGS ON DIAGNOSTIC IMAGING OF HEART AND CORONARY CIRCULATION: ICD-10-CM

## 2020-02-20 PROCEDURE — 99244 OFF/OP CNSLTJ NEW/EST MOD 40: CPT

## 2020-02-21 NOTE — HISTORY OF PRESENT ILLNESS
[FreeTextEntry1] : Mr. Miller is a 59 year old male here today for initial evaluation of abnormal ECHO. He has a past medical history significant for OLIVER, Cholecystitis, Dilated Aortic Root, Ventricular Septal Defect (age 15 at [facility]) and Vitamin Deficiency. \par \par He reports being easily fatigued and naps frequently.  He denies any shortness of breath, chest pain, palpitations, lower extremity edema, dizziness or syncope.  He is retired from work 2 years ago and is now relatively sedentary.  He is independent in ADL and is able to ambulate and drive without difficulty. \par \par The patient's past medical history, past surgical history, family history, social history, allergies, medications, and multisystem review of systems were individually reviewed with the patient.  There are no pertinent additions or subtractions.  The patient was personally seen and examined.\par \par Echocardiogram personally reviewed and discussed directly with Dr. Hernandez.

## 2020-02-21 NOTE — PHYSICAL EXAM
[General Appearance - In No Acute Distress] : in no acute distress [Sclera] : the sclera and conjunctiva were normal [Neck Appearance] : the appearance of the neck was normal [Outer Ear] : the ears and nose were normal in appearance [Jugular Venous Distention Increased] : there was no jugular-venous distention [Respiration, Rhythm And Depth] : normal respiratory rhythm and effort [Auscultation Breath Sounds / Voice Sounds] : lungs were clear to auscultation bilaterally [Heart Rate And Rhythm] : heart rate was normal and rhythm regular [Examination Of The Chest] : the chest was normal in appearance [Abnormal Walk] : normal gait [Chest Visual Inspection Thoracic Asymmetry] : no chest asymmetry [Skin Lesions] : no skin lesions [Skin Color & Pigmentation] : normal skin color and pigmentation [Motor Exam] : the motor exam was normal [Sensation] : the sensory exam was normal to light touch and pinprick [Oriented To Time, Place, And Person] : oriented to person, place, and time [Impaired Insight] : insight and judgment were intact [FreeTextEntry1] : NYHAC I,  Grade 3/6 systolic murmur at [place]

## 2020-02-21 NOTE — DATA REVIEWED
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  \par - Aortic root measures 4 cm at level of sinus\par \par CT Scan performed at [facility] on 12/07/19\par - aortic root 40 x42 x45 mm cusp to commissure  (up to 4.5cm)\par - sinotubular junction 31 x 33 mm\par - transverse arch 24 x 25 mm\par - mid descending at the level of right pulmonary artery 25 x 25mm\par - diaphragm level 22 x 22 mm

## 2020-02-21 NOTE — ASSESSMENT
[FreeTextEntry1] : The is a very pleasant 59-year-old gentleman. He is status post VST repair at the age of 15 at [facility]. We will attempt to obtain the operative port, however this was 40 years ago and I doubt will be available.  The patient was referred to cardiology prior to elective cholecystectomy last November. Prior to being seen by cardiology, he denies any knowledge her history of mitral valve regurgitation. Although the patient reports being asymptomatic, his activity level is surprisingly very limited despite his young age and lack of physical disability. According to his aunt who was present during the consultation, "he does not do anything."\par \par NBA was personally reviewed, along with CT angiogram. Aortic root is measured with a maximal diameter of 4.5 cm. However, this was not a center line at measurement, I therefore believe this is an overestimation. I believe aortic root measured between 4.0 at 4.2 cm. On NBA the aortic root measures 4 cm. Therefore, this will not require intervention. However, there is certainly severe mitral valve regurgitation with myxomatous valve. There is prolapse of both anterior and posterior segments to require chordal support. There was also mild-to-moderate tricuspuid valve regurgitation which is not surprising in the setting of the mitral valve regurgitation, and prior VST repair. Presumably, this was performed through the right atrium and tricuspid valve.\par \par Mitral valve repair versus replacement is indicated. However, the patient is quite adamant that he does not want to undergo additional cardiac surgery. The natural progression of disease process was discussed with the patient, and the inevitability of them requiring surgery was also discussed. At this time the patient is willing to undergo a stress echocardiogram to evaluate RV function and pulmonary pressures in the setting of exercise. Following the echocardiogram will return for repeat evaluation and counseling. I've discussed this with Dr. Hernandez who will perform the test. We will defer cardiac catheterization until the patient is willing to undergo corrective surgery.\par \par I would like to thank you for referring this patient to my attention and for allowing me to participate in his care.  If there are any further questions, or I can be of any further assistance, please do not hesitate contacting me at any time.\par \par \par \par PLAN:\par -  Stress Echocardiogram, for RV function and pulmonary artery ressures pre/post treadmill (with Dr Hernandez)\par -  Return to office after testing, possibility of Cardiac Catheterization for pre-operative and right heart pressures\par -  Increase level of activity and increase exercise for at least 30 min per day 5 days a week\par \par \par Written by Alfredo Morales NP acting as a scribe for Dr. Hernadez. \par “The documentation recorded by the scribe accurately reflects the service I personally performed and the decisions made by me.” \par Timothy Hernadez MD. \par \par

## 2020-02-21 NOTE — CONSULT LETTER
[FreeTextEntry2] : Dr. Conner Hernandez\par [address] \par [place] [id number] [FreeTextEntry3] : Brian Jones MD\par Director of Thoracic, [facility]\par Cardiovascular & Thoracic Surgery\par Assitant Professor\par Cardiovascular & Thoracic Surgery\par [employer]\par \par [facility] \par [address] [place] [address]\[place] [address]\par [phone] Tel\par [phone] Fax\par

## 2020-02-21 NOTE — REVIEW OF SYSTEMS
[Feeling Tired] : feeling tired [Negative] : Endocrine [Chest Pain] : no chest pain [Palpitations] : no palpitations [Lower Ext Edema] : no extremity edema [Shortness Of Breath] : no shortness of breath [SOB on Exertion] : no shortness of breath during exertion [Orthopnea] : no orthopnea

## 2020-03-04 ENCOUNTER — OUTPATIENT (OUTPATIENT)
Dept: OUTPATIENT SERVICES | Facility: HOSPITAL | Age: 60
LOS: 1 days | End: 2020-03-04
Payer: COMMERCIAL

## 2020-03-04 DIAGNOSIS — I34.0 NONRHEUMATIC MITRAL (VALVE) INSUFFICIENCY: ICD-10-CM

## 2020-03-04 DIAGNOSIS — I38 ENDOCARDITIS, VALVE UNSPECIFIED: Chronic | ICD-10-CM

## 2020-03-04 DIAGNOSIS — Z90.49 ACQUIRED ABSENCE OF OTHER SPECIFIED PARTS OF DIGESTIVE TRACT: Chronic | ICD-10-CM

## 2020-03-04 PROCEDURE — 93351 STRESS TTE COMPLETE: CPT | Mod: 26

## 2020-03-04 PROCEDURE — 93351 STRESS TTE COMPLETE: CPT

## 2020-07-06 ENCOUNTER — APPOINTMENT (OUTPATIENT)
Dept: CARDIOLOGY | Facility: CLINIC | Age: 60
End: 2020-07-06
Payer: COMMERCIAL

## 2020-07-06 ENCOUNTER — NON-APPOINTMENT (OUTPATIENT)
Age: 60
End: 2020-07-06

## 2020-07-06 VITALS
TEMPERATURE: 98.2 F | OXYGEN SATURATION: 97 % | WEIGHT: 165 LBS | DIASTOLIC BLOOD PRESSURE: 62 MMHG | HEIGHT: 65 IN | HEART RATE: 70 BPM | BODY MASS INDEX: 27.49 KG/M2 | SYSTOLIC BLOOD PRESSURE: 117 MMHG

## 2020-07-06 PROCEDURE — 99214 OFFICE O/P EST MOD 30 MIN: CPT

## 2020-07-06 PROCEDURE — 93000 ELECTROCARDIOGRAM COMPLETE: CPT

## 2020-07-06 NOTE — HISTORY OF PRESENT ILLNESS
[FreeTextEntry1] : The patient is a 59-year-old  male with a history of a VSD repair at age 15 presents for a f/u visit. Patient has Ascending aortic aneurysm with Severe Mitral Regurgitation. Patient was seen by Dr Hernadez, Patient refused any open heart procedure.  Patient had a stress echo in 3/2020. Patient is getting workup for OLIVER.\par \par Patient is here for f/u. Patient had some nonspecific throat symptoms after taking toprol, he decided to stop it after 1 week.\par Patient denies any chest pain, dyspnea, palpitations, syncope. Patient is c/o fatigue. \par \par Patient has no history of diabetes, no history of hypertension, no history of prior coronary artery disease, no history of CHF. No history of cardiac arrhythmias.\par \par \par Patients aunt in the room

## 2020-07-06 NOTE — ASSESSMENT
[FreeTextEntry1] : EKG-7/6/2020 NSR, RBBB. Unchanged compared to prior EKG\par Stress Test 10/2019- Negative stress test.\par ECHO 10/2019- LVEF 50-55%. Mild Anterior leaflet MVP with Mild to Moderate MR. Aortic Root measuring 4.4 cm.\par \par Chest CT 12/7/2019- Aortic Root 4.5 cm\par NBA 12/10/2019- LVEF 50-55%. Severe MR with A2, A3 and P3 prolapse with 3-4+ Mitral regurgitation.\par Stress Echo 3/4/2020- Normal Stress Echo, Moderate MR. No evidence of exercise induced pulmonary HTN. LVEDD 5.2 cm. RV Systolic function was normal\par \par Assessment:\par 1. MVP with Severe MR\par 2. S/P VSD Repair at age 15 at [facility]\par 3. Aortic Root Dilatation- 4.5 cm\par 4. OLIVER\par \par Recommendations:\par \par Patient is still against open heart procedure.\par Paitent is currently asymptomatic.\par Patient was educated regarding the importance of Beta blocker therapy for aortic aneurysm and also he needs to be on a Statin. Side effects and benefits were discussed, he agreed to try Toprol again, agreed to take a statin.\par \par 1.Restart Toprol XL 25 mg daily.\par 2.Lipitor 10 mg daily.\par 3.CMP and Lipid panel in 3 months prior to the visit\par 4.F/u in 3 months

## 2020-07-06 NOTE — PHYSICAL EXAM
[Normal Conjunctiva] : the conjunctiva exhibited no abnormalities [General Appearance - Well Nourished] : well nourished [General Appearance - Well Developed] : well developed [Normal Oral Mucosa] : normal oral mucosa [Auscultation Breath Sounds / Voice Sounds] : lungs were clear to auscultation bilaterally [Heart Sounds] : normal S1 and S2 [Abnormal Walk] : normal gait [Bowel Sounds] : normal bowel sounds [Abdomen Soft] : soft [] : no rash [FreeTextEntry1] : No JVD

## 2020-07-07 LAB
ALBUMIN SERPL ELPH-MCNC: 4.7 G/DL
ALP BLD-CCNC: 66 U/L
ALT SERPL-CCNC: 15 U/L
ANION GAP SERPL CALC-SCNC: 12 MMOL/L
AST SERPL-CCNC: 15 U/L
BILIRUB SERPL-MCNC: 0.7 MG/DL
BUN SERPL-MCNC: 13 MG/DL
CALCIUM SERPL-MCNC: 9.9 MG/DL
CHLORIDE SERPL-SCNC: 100 MMOL/L
CHOLEST SERPL-MCNC: 197 MG/DL
CHOLEST/HDLC SERPL: 4.3 RATIO
CO2 SERPL-SCNC: 28 MMOL/L
CREAT SERPL-MCNC: 1.07 MG/DL
GLUCOSE SERPL-MCNC: 92 MG/DL
HDLC SERPL-MCNC: 46 MG/DL
LDLC SERPL CALC-MCNC: 127 MG/DL
POTASSIUM SERPL-SCNC: 4.3 MMOL/L
PROT SERPL-MCNC: 7.5 G/DL
SODIUM SERPL-SCNC: 140 MMOL/L
TRIGL SERPL-MCNC: 120 MG/DL

## 2020-07-16 DIAGNOSIS — Z01.818 ENCOUNTER FOR OTHER PREPROCEDURAL EXAMINATION: ICD-10-CM

## 2020-07-18 ENCOUNTER — APPOINTMENT (OUTPATIENT)
Dept: DISASTER EMERGENCY | Facility: CLINIC | Age: 60
End: 2020-07-18

## 2020-07-18 LAB — SARS-COV-2 N GENE NPH QL NAA+PROBE: NOT DETECTED

## 2020-07-21 ENCOUNTER — OUTPATIENT (OUTPATIENT)
Dept: OUTPATIENT SERVICES | Facility: HOSPITAL | Age: 60
LOS: 1 days | End: 2020-07-21
Payer: COMMERCIAL

## 2020-07-21 DIAGNOSIS — Z90.49 ACQUIRED ABSENCE OF OTHER SPECIFIED PARTS OF DIGESTIVE TRACT: Chronic | ICD-10-CM

## 2020-07-21 DIAGNOSIS — G47.33 OBSTRUCTIVE SLEEP APNEA (ADULT) (PEDIATRIC): ICD-10-CM

## 2020-07-21 DIAGNOSIS — I38 ENDOCARDITIS, VALVE UNSPECIFIED: Chronic | ICD-10-CM

## 2020-07-21 PROCEDURE — 95810 POLYSOM 6/> YRS 4/> PARAM: CPT | Mod: 26

## 2020-07-21 PROCEDURE — 95810 POLYSOM 6/> YRS 4/> PARAM: CPT

## 2020-07-28 NOTE — ED ADULT NURSE NOTE - PAIN: DESCRIPTION (FREQUENCY/QUALITY)
Patient & Pharmacy is requesting a refill of:  Clindamycin-BP 1.2-2.5% gel  Last filled: 4/29/19  Number of refills: 11    Next office visit: 9/9/2020  Last office visit: 12/9/19  F/u recommended: 4 months    12/9/19 Office visit notes:   ASSESSMENT/PLAN:      1.  Cystic acne in patient with polycystic ovarian syndrome - flared when she stopped taking birth control pills. It is stable now with doxycycline 100 mg 2 times daily, Spironolactone 100 mg 2 times daily, generic Acanya gel every morning and Tazorac 0.05% cream every night.  The flare on her nose may be secondary to rosacea.  I discussed potential exacerbating factors of rosacea.  It is improved with azelaic acid 15% gel every night but may increase to 2 times daily.  I told her not to take doxycycline intermittently, which she does not do.  I also discussed potential risks and benefits of intralesional injection with steroids to acne cysts    Per refill protocol medication was refilled.    Called and informed patient that medication was refilled.   constant/pressure

## 2020-10-09 ENCOUNTER — APPOINTMENT (OUTPATIENT)
Dept: CARDIOLOGY | Facility: CLINIC | Age: 60
End: 2020-10-09

## 2020-10-14 ENCOUNTER — APPOINTMENT (OUTPATIENT)
Dept: PULMONOLOGY | Facility: CLINIC | Age: 60
End: 2020-10-14
Payer: COMMERCIAL

## 2020-10-14 VITALS
OXYGEN SATURATION: 96 % | DIASTOLIC BLOOD PRESSURE: 62 MMHG | WEIGHT: 169 LBS | HEART RATE: 82 BPM | BODY MASS INDEX: 28.12 KG/M2 | SYSTOLIC BLOOD PRESSURE: 126 MMHG

## 2020-10-14 VITALS — RESPIRATION RATE: 16 BRPM

## 2020-10-14 PROCEDURE — 99214 OFFICE O/P EST MOD 30 MIN: CPT

## 2020-10-14 NOTE — PHYSICAL EXAM
[No Acute Distress] : no acute distress [Enlarged Base of the Tongue] : enlarged base of the tongue [Micrognathia] : micrognathia [III] : Mallampati Class: III [Normal Appearance] : normal appearance [No Neck Mass] : no neck mass [Normal Rate/Rhythm] : normal rate/rhythm [Normal S1, S2] : normal s1, s2 [No Murmurs] : no murmurs [No Resp Distress] : no resp distress [Clear to Auscultation Bilaterally] : clear to auscultation bilaterally [No Abnormalities] : no abnormalities [Benign] : benign [Normal Gait] : normal gait [No Clubbing] : no clubbing [No Cyanosis] : no cyanosis [No Edema] : no edema [FROM] : FROM [Normal Color/ Pigmentation] : normal color/ pigmentation [No Focal Deficits] : no focal deficits [Oriented x3] : oriented x3 [Normal Affect] : normal affect [TextBox_11] : Lateral ridging to tongue

## 2020-10-14 NOTE — ASSESSMENT
[FreeTextEntry1] : Most likely severe obstructive sleep apnea. A repeat sleep study will be done using zolpidem 10 mg when he arrives at the lab. A prescription has been given to him for that. Followup in the office after the sleep study is done.

## 2020-10-14 NOTE — HISTORY OF PRESENT ILLNESS
[TextBox_4] : The patient came in today to discuss his recent sleep study. He had insufficient sleep to make a full diagnosis, only 35 minutes. Nonetheless he did show a suggestion of severe obstructive sleep apnea. A repeat sleep study will need to be done.

## 2020-10-26 ENCOUNTER — NON-APPOINTMENT (OUTPATIENT)
Age: 60
End: 2020-10-26

## 2020-10-26 ENCOUNTER — APPOINTMENT (OUTPATIENT)
Dept: CARDIOLOGY | Facility: CLINIC | Age: 60
End: 2020-10-26
Payer: COMMERCIAL

## 2020-10-26 VITALS
DIASTOLIC BLOOD PRESSURE: 66 MMHG | OXYGEN SATURATION: 96 % | SYSTOLIC BLOOD PRESSURE: 127 MMHG | WEIGHT: 169 LBS | HEART RATE: 103 BPM | BODY MASS INDEX: 28.16 KG/M2 | HEIGHT: 65 IN | TEMPERATURE: 98.5 F

## 2020-10-26 PROCEDURE — 99072 ADDL SUPL MATRL&STAF TM PHE: CPT

## 2020-10-26 PROCEDURE — 99213 OFFICE O/P EST LOW 20 MIN: CPT

## 2020-10-26 PROCEDURE — 93000 ELECTROCARDIOGRAM COMPLETE: CPT

## 2020-10-26 RX ORDER — ERGOCALCIFEROL 1.25 MG/1
1.25 MG CAPSULE, LIQUID FILLED ORAL
Qty: 4 | Refills: 6 | Status: DISCONTINUED | COMMUNITY
Start: 2020-07-08 | End: 2020-10-26

## 2020-10-26 RX ORDER — ZOLPIDEM TARTRATE 10 MG/1
10 TABLET ORAL
Qty: 1 | Refills: 0 | Status: DISCONTINUED | COMMUNITY
Start: 2020-10-14 | End: 2020-10-26

## 2020-10-26 NOTE — HISTORY OF PRESENT ILLNESS
[FreeTextEntry1] : The patient is a 59-year-old  male with a history of a VSD repair at age 15 presents for a f/u visit. Patient has Ascending aortic aneurysm with Severe Mitral Regurgitation. Patient was seen by Dr Hernadez, Patient refused any open heart procedure.  Patient had a stress echo in 3/2020. Patient is getting workup for OLIVER.\par \par Patient is here for f/u. Patient is taking Toprol and Lipitor.\par Patient denies any chest pain, dyspnea, palpitations, syncope. Patient is c/o fatigue. \par \par Patient has no history of diabetes, no history of hypertension, no history of prior coronary artery disease, no history of CHF. No history of cardiac arrhythmias.\par \par \par

## 2020-10-26 NOTE — PHYSICAL EXAM
[General Appearance - Well Developed] : well developed [General Appearance - Well Nourished] : well nourished [Normal Conjunctiva] : the conjunctiva exhibited no abnormalities [Normal Oral Mucosa] : normal oral mucosa [Auscultation Breath Sounds / Voice Sounds] : lungs were clear to auscultation bilaterally [Heart Sounds] : normal S1 and S2 [Bowel Sounds] : normal bowel sounds [Abdomen Soft] : soft [Abnormal Walk] : normal gait [] : no rash [FreeTextEntry1] : No JVD

## 2020-10-26 NOTE — ASSESSMENT
[FreeTextEntry1] : EKG-7/6/2020 NSR, RBBB. Unchanged compared to prior EKG\par Stress Test 10/2019- Negative stress test.\par ECHO 10/2019- LVEF 50-55%. Mild Anterior leaflet MVP with Mild to Moderate MR. Aortic Root measuring 4.4 cm.\par \par Chest CT 12/7/2019- Aortic Root 4.5 cm\par NBA 12/10/2019- LVEF 50-55%. Severe MR with A2, A3 and P3 prolapse with 3-4+ Mitral regurgitation.\par Stress Echo 3/4/2020- Normal Stress Echo, Moderate MR. No evidence of exercise induced pulmonary HTN. LVEDD 5.2 cm. RV Systolic function was normal\par \par Assessment:\par 1. MVP with Severe MR\par 2. S/P VSD Repair at age 15 at [facility]\par 3. Aortic Root Dilatation- 4.5 cm\par 4. OLIVER\par \par Recommendations:\par \par \par 1. Contiuet Toprol XL 25 mg daily and Lipitor 10 mg daily.\par 2.CMP and Lipid panel in 3 months prior to the visit\par 3.F/u in 6 months\par 4. Next visit may need Chest CT to monitor Aortic Root size

## 2020-11-19 ENCOUNTER — APPOINTMENT (OUTPATIENT)
Dept: DISASTER EMERGENCY | Facility: CLINIC | Age: 60
End: 2020-11-19

## 2020-11-20 LAB — SARS-COV-2 N GENE NPH QL NAA+PROBE: NOT DETECTED

## 2020-11-23 ENCOUNTER — OUTPATIENT (OUTPATIENT)
Dept: OUTPATIENT SERVICES | Facility: HOSPITAL | Age: 60
LOS: 1 days | End: 2020-11-23
Payer: COMMERCIAL

## 2020-11-23 DIAGNOSIS — G47.33 OBSTRUCTIVE SLEEP APNEA (ADULT) (PEDIATRIC): ICD-10-CM

## 2020-11-23 DIAGNOSIS — I38 ENDOCARDITIS, VALVE UNSPECIFIED: Chronic | ICD-10-CM

## 2020-11-23 DIAGNOSIS — Z90.49 ACQUIRED ABSENCE OF OTHER SPECIFIED PARTS OF DIGESTIVE TRACT: Chronic | ICD-10-CM

## 2020-11-23 PROCEDURE — 95810 POLYSOM 6/> YRS 4/> PARAM: CPT | Mod: 26

## 2020-11-23 PROCEDURE — 95810 POLYSOM 6/> YRS 4/> PARAM: CPT

## 2020-12-09 ENCOUNTER — APPOINTMENT (OUTPATIENT)
Dept: PULMONOLOGY | Facility: CLINIC | Age: 60
End: 2020-12-09
Payer: COMMERCIAL

## 2020-12-09 VITALS
HEIGHT: 65 IN | HEART RATE: 84 BPM | BODY MASS INDEX: 27.99 KG/M2 | SYSTOLIC BLOOD PRESSURE: 120 MMHG | RESPIRATION RATE: 16 BRPM | OXYGEN SATURATION: 97 % | DIASTOLIC BLOOD PRESSURE: 60 MMHG | WEIGHT: 168 LBS

## 2020-12-09 DIAGNOSIS — G47.33 OBSTRUCTIVE SLEEP APNEA (ADULT) (PEDIATRIC): ICD-10-CM

## 2020-12-09 PROCEDURE — 99214 OFFICE O/P EST MOD 30 MIN: CPT

## 2020-12-09 PROCEDURE — 99072 ADDL SUPL MATRL&STAF TM PHE: CPT

## 2020-12-09 NOTE — HISTORY OF PRESENT ILLNESS
[Obstructive Sleep Apnea] : obstructive sleep apnea [TextBox_100] : 11/20 [TextBox_108] : 23 [TextBox_112] : 97 [TextBox_116] : 83 [TextBox_120] : AHI 42 in REM [TextBox_165] : I reviewed the patient's sleep study with the patient.\par

## 2020-12-09 NOTE — ASSESSMENT
[FreeTextEntry1] : The patient has moderate obstructive sleep apnea. He has retrognathia and would be an excellent candidate for oral appliance therapy. Dental referral has been made. I will see him back after the oral appliance is fitted so that I can restudy him with the appliance in place.

## 2020-12-26 ENCOUNTER — OUTPATIENT (OUTPATIENT)
Dept: OUTPATIENT SERVICES | Facility: HOSPITAL | Age: 60
LOS: 1 days | End: 2020-12-26
Payer: COMMERCIAL

## 2020-12-26 DIAGNOSIS — Z20.828 CONTACT WITH AND (SUSPECTED) EXPOSURE TO OTHER VIRAL COMMUNICABLE DISEASES: ICD-10-CM

## 2020-12-26 DIAGNOSIS — I38 ENDOCARDITIS, VALVE UNSPECIFIED: Chronic | ICD-10-CM

## 2020-12-26 DIAGNOSIS — Z90.49 ACQUIRED ABSENCE OF OTHER SPECIFIED PARTS OF DIGESTIVE TRACT: Chronic | ICD-10-CM

## 2020-12-26 LAB — SARS-COV-2 RNA SPEC QL NAA+PROBE: DETECTED

## 2020-12-26 PROCEDURE — C9803: CPT

## 2020-12-26 PROCEDURE — U0003: CPT

## 2020-12-27 DIAGNOSIS — Z20.828 CONTACT WITH AND (SUSPECTED) EXPOSURE TO OTHER VIRAL COMMUNICABLE DISEASES: ICD-10-CM

## 2020-12-28 DIAGNOSIS — E78.5 HYPERLIPIDEMIA, UNSPECIFIED: ICD-10-CM

## 2021-01-25 ENCOUNTER — OUTPATIENT (OUTPATIENT)
Dept: OUTPATIENT SERVICES | Facility: HOSPITAL | Age: 61
LOS: 1 days | End: 2021-01-25
Payer: COMMERCIAL

## 2021-01-25 DIAGNOSIS — Z90.49 ACQUIRED ABSENCE OF OTHER SPECIFIED PARTS OF DIGESTIVE TRACT: Chronic | ICD-10-CM

## 2021-01-25 DIAGNOSIS — Z20.828 CONTACT WITH AND (SUSPECTED) EXPOSURE TO OTHER VIRAL COMMUNICABLE DISEASES: ICD-10-CM

## 2021-01-25 DIAGNOSIS — I38 ENDOCARDITIS, VALVE UNSPECIFIED: Chronic | ICD-10-CM

## 2021-01-25 PROCEDURE — C9803: CPT

## 2021-01-25 PROCEDURE — U0005: CPT

## 2021-01-25 PROCEDURE — U0003: CPT

## 2021-01-26 DIAGNOSIS — Z20.828 CONTACT WITH AND (SUSPECTED) EXPOSURE TO OTHER VIRAL COMMUNICABLE DISEASES: ICD-10-CM

## 2021-01-26 LAB — SARS-COV-2 RNA SPEC QL NAA+PROBE: SIGNIFICANT CHANGE UP

## 2021-02-10 NOTE — ED STATDOCS - CONSTITUTIONAL, MLM
Continuity of Care Form    Patient Name: Michelet Cha   :  1937  MRN:  [id number]    Admit date:  2021  Discharge date:  2021    Code Status Order: Full Code   Advance Directives:   Advance Care Flowsheet Documentation     Date/Time Healthcare Directive Type of Healthcare Directive Copy in [address] Agent's Name Healthcare Agent's Phone Number    21 1816  Yes, patient has an advance directive for healthcare treatment  Durable power of  for health care  No, copy requested from family  Prachi Hendricks --          Admitting Physician:  Amos Page MD  PCP: Juan Meade MD    Discharging Nurse: Ryan Loo [facility] Unit/Room#: 8E-70/070-A  Discharging Unit Phone Number: [phone]    Emergency Contact:   Extended Emergency Contact Information  Primary Emergency Contact: [address] Phone: [phone]  Relation: Child  Secondary Emergency Contact: Lyubov Henriquez  Address: [address] [place] [address], [address] [place] [address] Phone: [phone]  Relation: Spouse    Past Surgical History:  Past Surgical History:   Procedure Laterality Date    ABDOMEN SURGERY Bilateral 2018    LESION LEFT FLANK AND RIGHT UPPER LATERAL ABDOMEN performed by Norma Ortez MD at [facility]      last one     EAR SURGERY Right 2019    MOHS REPAIR SCC RIGHT HELICAL RIM WITH FULL THICKNESS SKIN GRAFT performed by Norma Ortez MD at [address] [facility]    right    MOHS SURGERY  2018    MOHS repair SCC at apex of scalp    MOHS SURGERY N/A 2018    Mohs repair of SCC of apex scalp    MOHS SURGERY  2018    Closure of MOHS of scalp    [place] OFFICE/OUTPT VISIT,PROCEDURE ONLY N/A 3/16/2018    SCC OF APEX OF SCALP radical removal of bone performed by Norma Ortez MD at [address] [place] [address] Mobility/ADLs:  Walking   Independent  Transfer  Independent  Bathing  Assisted  Dressing  Assisted  Toileting  Independent  Feeding  [address]  Independent  Med Delivery   whole    Wound Care Documentation and Therapy:        Elimination:  Continence:   · Bowel: Yes  · Bladder: Yes  Urinary Catheter: None   Colostomy/Ileostomy/Ileal Conduit: No       Date of Last BM: 02/11/21    Safety Concerns: At Risk for Falls    Impairments/Disabilities:      None    Nutrition Therapy:  Current Nutrition Therapy:   - Oral Diet:  General    Routes of Feeding: Oral  Liquids: Thin Liquids  Daily Fluid Restriction: no  Last Modified Barium Swallow with Video (Video Swallowing Test): not done    Treatments at the Time of Hospital Discharge:   Respiratory Treatments:   Oxygen Therapy:  is not on home oxygen therapy.   Ventilator:    - No ventilator support    Rehab Therapies: Physical Therapy and Occupational Therapy  Weight Bearing Status/Restrictions: No weight bearing restirctions  Other Medical Equipment (for information only, NOT a DME order):  walker  Other Treatments:     Patient's personal belongings (please select all that are sent with patient):  Astrid    RN SIGNATURE:  Electronically signed by Diana Manriquez RN on 2/11/21 at 10:02 AM EST    CASE MANAGEMENT/SOCIAL WORK SECTION    Inpatient Status Date: TCU 1/29/21    Readmission Risk Assessment Score:  Readmission Risk              Risk of Unplanned Readmission:        14           Discharging to Facility/ Agency   · Name:   · Address:  · Phone:  · Fax:    Dialysis Facility (if applicable)   · Name:  · Address:  · Dialysis Schedule:  · Phone:  · Fax:    / signature: {Esignature:[id number]}    PHYSICIAN SECTION    Prognosis: {Prognosis:9517281996}    Condition at Discharge: [address] [place] [address] Patient Condition:[id number]}    Rehab Potential (if transferring to Rehab): {Prognosis:9421878162}    Recommended Labs or Other Treatments After Discharge: ***    Physician Certification: I certify the above information and transfer of Elyssa Aguirre  is necessary for the continuing treatment of the diagnosis listed and that he requires {Admit to Appropriate Level of Care:25532} for {GREATER/LESS:839191690} 30 days.      Update Admission H&P: {CHP DME Changes in HGHOP:613912525}    PHYSICIAN SIGNATURE:  {Esignature:384798115} normal... Non toxic appearing and in no acute distress.

## 2021-04-16 ENCOUNTER — NON-APPOINTMENT (OUTPATIENT)
Age: 61
End: 2021-04-16

## 2021-05-17 ENCOUNTER — NON-APPOINTMENT (OUTPATIENT)
Age: 61
End: 2021-05-17

## 2021-05-17 ENCOUNTER — APPOINTMENT (OUTPATIENT)
Dept: CARDIOLOGY | Facility: CLINIC | Age: 61
End: 2021-05-17
Payer: COMMERCIAL

## 2021-05-17 VITALS
BODY MASS INDEX: 28.16 KG/M2 | WEIGHT: 169 LBS | SYSTOLIC BLOOD PRESSURE: 130 MMHG | HEART RATE: 73 BPM | OXYGEN SATURATION: 74 % | TEMPERATURE: 98.3 F | RESPIRATION RATE: 14 BRPM | HEIGHT: 65 IN | DIASTOLIC BLOOD PRESSURE: 60 MMHG

## 2021-05-17 PROCEDURE — 99072 ADDL SUPL MATRL&STAF TM PHE: CPT

## 2021-05-17 PROCEDURE — 99213 OFFICE O/P EST LOW 20 MIN: CPT

## 2021-05-17 PROCEDURE — 93000 ELECTROCARDIOGRAM COMPLETE: CPT

## 2021-05-17 NOTE — ASSESSMENT
[FreeTextEntry1] : EKG-7/6/2020 NSR, RBBB. Unchanged compared to prior EKG\par Stress Test 10/2019- Negative stress test.\par ECHO 10/2019- LVEF 50-55%. Mild Anterior leaflet MVP with Mild to Moderate MR. Aortic Root measuring 4.4 cm.\par \par Chest CT 12/7/2019- Aortic Root 4.5 cm\par NBA 12/10/2019- LVEF 50-55%. Severe MR with A2, A3 and P3 prolapse with 3-4+ Mitral regurgitation.\par Stress Echo 3/4/2020- Normal Stress Echo, Moderate MR. No evidence of exercise induced pulmonary HTN. LVEDD 5.2 cm. RV Systolic function was normal\par \par EKG 5/17/2021- Sinus  Rhythm \par -Right bundle branch block. \par \par Assessment:\par 1. MVP with Severe MR\par 2. S/P VSD Repair at age 15 at [facility]\par 3. Aortic Root Dilatation- 4.5 cm\par 4. OLIVER\par \par Recommendations:\par \par 1. Contiuet Toprol XL 25 mg daily and Lipitor 10 mg daily.  Patient was counseled regarding the importance of compliance with the medical therapy\par 2.CMP and Lipid panel in 4 months prior to the visit\par 3.F/u in 6 months\par 4.  Chest CT with and without contrast to assess aortic root size

## 2021-05-17 NOTE — PHYSICAL EXAM
[Well Developed] : well developed [Normal Conjunctiva] : normal conjunctiva [Normal Venous Pressure] : normal venous pressure [Normal S1, S2] : normal S1, S2 [No Rub] : no rub [No Gallop] : no gallop [Normal] : no edema, no cyanosis, no clubbing, no varicosities [de-identified] : Systolic murmur at apex

## 2021-05-17 NOTE — HISTORY OF PRESENT ILLNESS
[FreeTextEntry1] : The patient is a 60-year-old  male with a history of a VSD repair at age 15 presents for a f/u visit. Patient has Ascending aortic aneurysm with Severe Mitral Regurgitation. Patient was seen by Dr Hernadez, Patient refused any open heart procedure.  Patient had a stress echo in 3/2020. Patient is getting workup for OLIVER.\par \par Patient is here for f/u. Patient not taking Toprol and Lipitor, he did not know that he had her refills left\par Patient denies any chest pain, dyspnea, palpitations, syncope. Patient is c/o fatigue. \par \par Patient has no history of diabetes, no history of hypertension, no history of prior coronary artery disease, no history of CHF. No history of cardiac arrhythmias.\par \par \par Patient's aunt is in the room

## 2021-05-17 NOTE — REASON FOR VISIT
[Structural Heart and Valve Disease] : structural heart and valve disease [Family Member] : family member

## 2021-06-08 ENCOUNTER — APPOINTMENT (OUTPATIENT)
Dept: TRAUMA SURGERY | Facility: CLINIC | Age: 61
End: 2021-06-08
Payer: COMMERCIAL

## 2021-06-08 VITALS
BODY MASS INDEX: 27.99 KG/M2 | WEIGHT: 168 LBS | HEIGHT: 65 IN | SYSTOLIC BLOOD PRESSURE: 134 MMHG | OXYGEN SATURATION: 96 % | TEMPERATURE: 97.4 F | DIASTOLIC BLOOD PRESSURE: 64 MMHG | HEART RATE: 86 BPM

## 2021-06-08 DIAGNOSIS — K42.9 UMBILICAL HERNIA W/OUT OBSTRUCTION OR GANGRENE: ICD-10-CM

## 2021-06-08 PROCEDURE — 99213 OFFICE O/P EST LOW 20 MIN: CPT

## 2021-06-08 PROCEDURE — 99072 ADDL SUPL MATRL&STAF TM PHE: CPT

## 2021-06-09 PROBLEM — K42.9 UMBILICAL HERNIA: Status: ACTIVE | Noted: 2021-06-09

## 2021-06-09 NOTE — PHYSICAL EXAM
[Abdomen Tenderness] : ~T ~M No abdominal tenderness [No Rash or Lesion] : No rash or lesion [Purpura] : no purpura  [Petechiae] : no petechiae [Skin Ulcer] : no ulcer [Skin Induration] : no induration [Alert] : alert [Oriented to Person] : oriented to person [Oriented to Place] : oriented to place [Oriented to Time] : oriented to time [Calm] : calm [de-identified] : wdwn  male   in  nad   pleasant mannered [de-identified] : non icteric sclera [de-identified] : soft   non tender no distension of abdomen   visible  umbilical hernia   non tender to palpation  no  signs of  incarceration    skin intact no breakdown no  guarding  no  rebound

## 2021-06-09 NOTE — ASSESSMENT
[FreeTextEntry1] : Due to  patients present Cardiology health  and  status - patient needs  to complete  cardiology testing  and  clearance  before any elective  surgical intervention to repair umbilical hernia\par Discussion with patient  that  if  there becomes  any a cute changes  to hernia   and  onset of pain to go directly to [facility]\par Patient  understands and  will comply with Dr Rosen\par \par Time  spent  with  patient  25 minutes

## 2021-06-09 NOTE — HISTORY OF PRESENT ILLNESS
[FreeTextEntry1] : Patient presents  to [facility] clinic for  consult with ACS  surgeon  for  possible  repair of umbilical hernia Patient denies  any fever no chills   no distress  from  hernia    no n/v/d  nl  bm nl urination Patient presently under  Cardiology  care and  testing  required.  Spouse at bedside  for  entire  exam\par I was present  for  entire exam  by Dr Anna Rosen  I am  writing  this  note on her behalf

## 2021-11-19 ENCOUNTER — APPOINTMENT (OUTPATIENT)
Dept: CARDIOLOGY | Facility: CLINIC | Age: 61
End: 2021-11-19
Payer: COMMERCIAL

## 2021-11-19 ENCOUNTER — NON-APPOINTMENT (OUTPATIENT)
Age: 61
End: 2021-11-19

## 2021-11-19 VITALS
HEART RATE: 70 BPM | TEMPERATURE: 98.7 F | BODY MASS INDEX: 27.32 KG/M2 | SYSTOLIC BLOOD PRESSURE: 131 MMHG | HEIGHT: 65 IN | OXYGEN SATURATION: 95 % | WEIGHT: 164 LBS | DIASTOLIC BLOOD PRESSURE: 65 MMHG

## 2021-11-19 DIAGNOSIS — I34.0 NONRHEUMATIC MITRAL (VALVE) INSUFFICIENCY: ICD-10-CM

## 2021-11-19 PROCEDURE — 93000 ELECTROCARDIOGRAM COMPLETE: CPT

## 2021-11-19 PROCEDURE — 99213 OFFICE O/P EST LOW 20 MIN: CPT

## 2021-11-19 NOTE — PHYSICAL EXAM
[Well Developed] : well developed [Normal Conjunctiva] : normal conjunctiva [Normal Venous Pressure] : normal venous pressure [Normal S1, S2] : normal S1, S2 [No Rub] : no rub [No Gallop] : no gallop [Normal] : no edema, no cyanosis, no clubbing, no varicosities [de-identified] : Systolic murmur at apex

## 2021-11-19 NOTE — HISTORY OF PRESENT ILLNESS
[FreeTextEntry1] : The patient is a 61-year-old  male with a history of a VSD repair at age 15 presents for a f/u visit. Patient has Ascending aortic aneurysm with Severe Mitral Regurgitation. Patient was seen by Dr Hernadez, Patient refused any open heart procedure.  Patient had a stress echo in 3/2020. Patient has  OLIVER.\par \par Patient is here for f/u. Patient reports compliance witht taking Toprol and Lipitor. Patient did not do the Chest CT Scan, he says he forgot about it.\par Patient denies any chest pain, dyspnea, palpitations, syncope. Patient is c/o fatigue. \par \par Patient has no history of diabetes, no history of hypertension, no history of prior coronary artery disease, no history of CHF. No history of cardiac arrhythmias.\par \par \par

## 2021-11-19 NOTE — REASON FOR VISIT
[Structural Heart and Valve Disease] : structural heart and valve disease [Hyperlipidemia] : hyperlipidemia

## 2021-11-19 NOTE — ASSESSMENT
[FreeTextEntry1] : EKG-7/6/2020 NSR, RBBB. Unchanged compared to prior EKG\par Stress Test 10/2019- Negative stress test.\par ECHO 10/2019- LVEF 50-55%. Mild Anterior leaflet MVP with Mild to Moderate MR. Aortic Root measuring 4.4 cm.\par \par Chest CT 12/7/2019- Aortic Root 4.5 cm\par NBA 12/10/2019- LVEF 50-55%. Severe MR with A2, A3 and P3 prolapse with 3-4+ Mitral regurgitation.\par Stress Echo 3/4/2020- Normal Stress Echo, Moderate MR. No evidence of exercise induced pulmonary HTN. LVEDD 5.2 cm. RV Systolic function was normal\par \par EKG 5/17/2021- Sinus  Rhythm \par -Right bundle branch block. \par \par EKG 11/19/2021- Sinus  Rhythm \par -Right bundle branch block. \par \par Assessment:\par 1. MVP with Severe MR\par 2. S/P VSD Repair at age 15 at [facility]\par 3. Aortic Root Dilatation- 4.5 cm\par 4. OLIVER\par \par Recommendations:\par \par 1. Contiuet Toprol XL 25 mg daily and Lipitor 10 mg daily.  \par 2.CMP and Lipid panel in 4 months prior to the visit\par 3.F/u after Chest CT\par 4.  Chest CT with and without contrast to assess aortic root size and echo

## 2021-12-03 ENCOUNTER — NON-APPOINTMENT (OUTPATIENT)
Age: 61
End: 2021-12-03

## 2021-12-16 NOTE — CHART NOTE - NSCHARTNOTEFT_GEN_A_CORE
POST-OPERATIVE NOTE    Subjective:  Patient is s/p lap angelita w/ IOC. Patient doing well post operatively. tolerating diet, voiding spontaneously, ambulating, and pain is well controlled. denies f/c/sob/n/v. AVSS     Vital Signs Last 24 Hrs  T(C): 36.6 (28 Oct 2019 19:26), Max: 37.1 (28 Oct 2019 11:17)  T(F): 97.8 (28 Oct 2019 19:26), Max: 98.7 (28 Oct 2019 11:17)  HR: 87 (28 Oct 2019 19:26) (71 - 94)  BP: 141/62 (28 Oct 2019 19:26) (94/57 - 141/62)  BP(mean): --  RR: 18 (28 Oct 2019 19:26) (13 - 22)  SpO2: 94% (28 Oct 2019 19:26) (94% - 100%)  I&O's Detail    27 Oct 2019 07:01  -  28 Oct 2019 07:00  --------------------------------------------------------  IN:    lactated ringers.: 1200 mL  Total IN: 1200 mL    OUT:  Total OUT: 0 mL    Total NET: 1200 mL      28 Oct 2019 07:01  -  28 Oct 2019 23:22  --------------------------------------------------------  IN:    sodium chloride 0.9%.: 225 mL  Total IN: 225 mL    OUT:    Voided: 650 mL  Total OUT: 650 mL    Total NET: -425 mL        heparin  Injectable 5000    PAST MEDICAL & SURGICAL HISTORY:  No pertinent past medical history  Heart valve regurgitation: as infant        Physical Exam:  General: NAD, resting comfortably in bed  Pulmonary: Nonlabored breathing, no respiratory distress  Cardiovascular: NSR  Abdominal: soft, NT/ND. incisions c/d/i  Extremities: WWP      LABS:                        13.4   8.52  )-----------( 219      ( 28 Oct 2019 06:24 )             39.0     10-28    136  |  99  |  7.0<L>  ----------------------------<  68<L>  4.1   |  24.0  |  0.71    Ca    9.1      28 Oct 2019 06:24  Phos  2.4     10-28  Mg     1.9     10-28    TPro  7.0  /  Alb  3.8  /  TBili  1.2  /  DBili  0.4<H>  /  AST  41<H>  /  ALT  93<H>  /  AlkPhos  128<H>  10-28      CAPILLARY BLOOD GLUCOSE          Radiology and Additional Studies:    Assessment:  The patient is a 58y Male who is now several hours post-op from a lap angelita w/ IOC    Plan:  - Pain control as needed  - DVT ppx  - OOB and ambulating as tolerated  - F/u AM LFTs  - likely dc in am POST-OPERATIVE NOTE    Subjective:  Patient is s/p lap angelita w/ IOC. Patient doing well post operatively. tolerating diet, voiding spontaneously, ambulating, and pain is well controlled. denies f/c/sob/n/v. AVSS     Vital Signs Last 24 Hrs  T(C): 36.6 (28 Oct 2019 19:26), Max: 37.1 (28 Oct 2019 11:17)  T(F): 97.8 (28 Oct 2019 19:26), Max: 98.7 (28 Oct 2019 11:17)  HR: 87 (28 Oct 2019 19:26) (71 - 94)  BP: 141/62 (28 Oct 2019 19:26) (94/57 - 141/62)  BP(mean): --  RR: 18 (28 Oct 2019 19:26) (13 - 22)  SpO2: 94% (28 Oct 2019 19:26) (94% - 100%)  I&O's Detail    27 Oct 2019 07:01  -  28 Oct 2019 07:00  --------------------------------------------------------  IN:    lactated ringers.: 1200 mL  Total IN: 1200 mL    OUT:  Total OUT: 0 mL    Total NET: 1200 mL      28 Oct 2019 07:01  -  28 Oct 2019 23:22  --------------------------------------------------------  IN:    sodium chloride 0.9%.: 225 mL  Total IN: 225 mL    OUT:    Voided: 650 mL  Total OUT: 650 mL    Total NET: -425 mL        heparin  Injectable 5000    PAST MEDICAL & SURGICAL HISTORY:  No pertinent past medical history  Heart valve regurgitation: as infant        Physical Exam:  General: NAD, resting comfortably in bed  Pulmonary: Nonlabored breathing, no respiratory distress  Cardiovascular: NSR  Abdominal: soft, appropriately tender, distended and tympanitic on percussion. incisions c/d/i  Extremities: [facility]      LABS:                        13.4   8.52  )-----------( 219      ( 28 Oct 2019 06:24 )             39.0     10-28    136  |  99  |  7.0<L>  ----------------------------<  68<L>  4.1   |  24.0  |  0.71    Ca    9.1      28 Oct 2019 06:24  Phos  2.4     10-28  Mg     1.9     10-28    TPro  7.0  /  Alb  3.8  /  TBili  1.2  /  DBili  0.4<H>  /  AST  41<H>  /  ALT  93<H>  /  AlkPhos  128<H>  10-28      CAPILLARY BLOOD GLUCOSE          Radiology and Additional Studies:    Assessment:  The patient is a 58y Male who is now several hours post-op from a [facility] w/ IOC    Plan:  - Pain control as needed  - bowel regimen  - DVT ppx  - OOB and ambulating as tolerated  - F/u AM LFTs  - likely dc in am Post-Care Instructions: I reviewed with the patient in detail post-care instructions. Patient is to wear sunprotection, and avoid picking at any of the treated lesions. Pt may apply Vaseline to crusted or scabbing areas. Price (Use Numbers Only, No Special Characters Or $): 25 Anesthesia Volume In Cc: 0 Detail Level: Detailed Consent: The patient's consent was obtained including but not limited to risks of crusting, scabbing, blistering, scarring, darker or lighter pigmentary change, recurrence, incomplete removal and infection.

## 2021-12-18 ENCOUNTER — APPOINTMENT (OUTPATIENT)
Dept: CT IMAGING | Facility: CLINIC | Age: 61
End: 2021-12-18
Payer: COMMERCIAL

## 2021-12-18 ENCOUNTER — OUTPATIENT (OUTPATIENT)
Dept: OUTPATIENT SERVICES | Facility: HOSPITAL | Age: 61
LOS: 1 days | End: 2021-12-18
Payer: COMMERCIAL

## 2021-12-18 DIAGNOSIS — I77.810 THORACIC AORTIC ECTASIA: ICD-10-CM

## 2021-12-18 DIAGNOSIS — Z00.8 ENCOUNTER FOR OTHER GENERAL EXAMINATION: ICD-10-CM

## 2021-12-18 DIAGNOSIS — I38 ENDOCARDITIS, VALVE UNSPECIFIED: Chronic | ICD-10-CM

## 2021-12-18 DIAGNOSIS — Z90.49 ACQUIRED ABSENCE OF OTHER SPECIFIED PARTS OF DIGESTIVE TRACT: Chronic | ICD-10-CM

## 2021-12-18 PROCEDURE — 71270 CT THORAX DX C-/C+: CPT | Mod: 26

## 2021-12-18 PROCEDURE — 82565 ASSAY OF CREATININE: CPT

## 2021-12-18 PROCEDURE — 71270 CT THORAX DX C-/C+: CPT

## 2021-12-20 ENCOUNTER — NON-APPOINTMENT (OUTPATIENT)
Age: 61
End: 2021-12-20

## 2022-09-02 ENCOUNTER — APPOINTMENT (OUTPATIENT)
Dept: CARDIOLOGY | Facility: CLINIC | Age: 62
End: 2022-09-02

## 2022-09-02 ENCOUNTER — NON-APPOINTMENT (OUTPATIENT)
Age: 62
End: 2022-09-02

## 2022-09-02 VITALS
HEART RATE: 68 BPM | TEMPERATURE: 98.3 F | HEIGHT: 65 IN | DIASTOLIC BLOOD PRESSURE: 53 MMHG | SYSTOLIC BLOOD PRESSURE: 108 MMHG | WEIGHT: 163 LBS | OXYGEN SATURATION: 95 % | BODY MASS INDEX: 27.16 KG/M2

## 2022-09-02 DIAGNOSIS — I77.810 THORACIC AORTIC ECTASIA: ICD-10-CM

## 2022-09-02 PROCEDURE — 99213 OFFICE O/P EST LOW 20 MIN: CPT | Mod: 25

## 2022-09-02 PROCEDURE — 93000 ELECTROCARDIOGRAM COMPLETE: CPT

## 2022-09-02 NOTE — PHYSICAL EXAM
[Well Developed] : well developed [Normal Conjunctiva] : normal conjunctiva [Normal Venous Pressure] : normal venous pressure [Normal S1, S2] : normal S1, S2 [No Rub] : no rub [No Gallop] : no gallop [Normal] : no edema, no cyanosis, no clubbing, no varicosities [de-identified] : Systolic murmur at apex

## 2022-09-02 NOTE — ASSESSMENT
[FreeTextEntry1] : EKG-7/6/2020 NSR, RBBB. Unchanged compared to prior EKG\par Stress Test 10/2019- Negative stress test.\par ECHO 10/2019- LVEF 50-55%. Mild Anterior leaflet MVP with Mild to Moderate MR. Aortic Root measuring 4.4 cm.\par \par Chest CT 12/7/2019- Aortic Root 4.5 cm\par NBA 12/10/2019- LVEF 50-55%. Severe MR with A2, A3 and P3 prolapse with 3-4+ Mitral regurgitation.\par Stress Echo 3/4/2020- Normal Stress Echo, Moderate MR. No evidence of exercise induced pulmonary HTN. LVEDD 5.2 cm. RV Systolic function was normal\par \par \par Chest CT scan December 2021 with and without contrast: Aortic root measuring 4.7 cm, previously it was 4.5 cm.\par \par Comprehensive metabolic panel #29 2021: Normal liver profile and basic metabolic panel,  cholesterol 174 HDL 45\par EKG 9/2/2022- Sinus  Rhythm \par -Right bundle branch block.  Unchanged from prior EKG\par \par Assessment:\par 1. MVP with Severe MR\par 2. S/P VSD Repair at age 15 at [facility]\par 3. Aortic Root Dilatation- 4.7 cm\par 4. OLIVER\par \par Recommendations:\par \par Results of chest CT scan discussed with the patient.  I suggested him to follow-up with Dr. Hernadez CT surgery, PATIENT STATES THAT NO MATTER WHAT HAPPENS HE DOES NOT WANT TO HAVE OPEN HEART SURGERY, SO HE REFUSED TO SEE CT SURGERY, PATIENT AND HIS AUNT AWARE OF POTENTIAL FOR SUDDEN DEATH WITH ANEURYSM RUPTURE. He does not want to do the echo\par \par 1. Contiuet Toprol XL 25 mg daily and Lipitor 10 mg daily.  \par 2.CMP and Lipid panel in 4 months prior to the visit\par 3. F/u in 6 months\par

## 2022-09-02 NOTE — HISTORY OF PRESENT ILLNESS
[FreeTextEntry1] : The patient is a 61-year-old  male with a history of a VSD repair at age 15.t. Patient has Ascending aortic aneurysm with Severe Mitral Regurgitation. Patient was seen by Dr Hernadez, Patient refused any open heart procedure.  Patient had a stress echo in 3/2020. Patient has  OLIVER.\par \par Patient is here for f/u. Patient reports compliance with taking Toprol and Lipitor. Patient has no complaints\par Patient denies any chest pain, dyspnea, palpitations, syncope. Patient is c/o fatigue. \par \par Patient has no history of diabetes, no history of hypertension, no history of prior coronary artery disease, no history of CHF. No history of cardiac arrhythmias.\par \par \par Aunt: Cindy Miller with the patient in the room

## 2023-04-17 ENCOUNTER — APPOINTMENT (OUTPATIENT)
Dept: CARDIOLOGY | Facility: CLINIC | Age: 63
End: 2023-04-17

## 2023-05-08 ENCOUNTER — APPOINTMENT (OUTPATIENT)
Dept: CARDIOLOGY | Facility: CLINIC | Age: 63
End: 2023-05-08
Payer: COMMERCIAL

## 2023-05-08 ENCOUNTER — NON-APPOINTMENT (OUTPATIENT)
Age: 63
End: 2023-05-08

## 2023-05-08 VITALS
BODY MASS INDEX: 26.79 KG/M2 | TEMPERATURE: 98 F | WEIGHT: 161 LBS | SYSTOLIC BLOOD PRESSURE: 115 MMHG | OXYGEN SATURATION: 95 % | DIASTOLIC BLOOD PRESSURE: 55 MMHG | HEART RATE: 81 BPM

## 2023-05-08 DIAGNOSIS — I34.0 NONRHEUMATIC MITRAL (VALVE) INSUFFICIENCY: ICD-10-CM

## 2023-05-08 PROCEDURE — 99213 OFFICE O/P EST LOW 20 MIN: CPT | Mod: 25

## 2023-05-08 PROCEDURE — 93000 ELECTROCARDIOGRAM COMPLETE: CPT

## 2023-05-08 RX ORDER — METOPROLOL SUCCINATE 25 MG/1
25 TABLET, EXTENDED RELEASE ORAL
Qty: 90 | Refills: 3 | Status: ACTIVE | COMMUNITY
Start: 2020-02-07 | End: 1900-01-01

## 2023-05-08 RX ORDER — ATORVASTATIN CALCIUM 10 MG/1
10 TABLET, FILM COATED ORAL
Qty: 90 | Refills: 3 | Status: ACTIVE | COMMUNITY
Start: 2020-10-26 | End: 1900-01-01

## 2023-05-08 NOTE — ASSESSMENT
[FreeTextEntry1] : EKG-7/6/2020 NSR, RBBB. Unchanged compared to prior EKG\par Stress Test 10/2019- Negative stress test.\par ECHO 10/2019- LVEF 50-55%. Mild Anterior leaflet MVP with Mild to Moderate MR. Aortic Root measuring 4.4 cm.\par \par Chest CT 12/7/2019- Aortic Root 4.5 cm\par NBA 12/10/2019- LVEF 50-55%. Severe MR with A2, A3 and P3 prolapse with 3-4+ Mitral regurgitation.\par Stress Echo 3/4/2020- Normal Stress Echo, Moderate MR. No evidence of exercise induced pulmonary HTN. LVEDD 5.2 cm. RV Systolic function was normal\par \par \par Chest CT scan December 2021 with and without contrast: Aortic root measuring 4.7 cm, previously it was 4.5 cm.\par \par \par \par EKG 5/8/2023- Sinus  Rhythm \par -Right bundle branch block. \par \par ABNORMAL \par Assessment:\par 1. MVP with Severe MR\par 2. S/P VSD Repair at age 15 at [facility]\par 3. Aortic Root Dilatation- 4.7 cm\par 4. OLIVER\par \par Recommendations:\par \par \par \par 1. Contiuet Toprol XL 25 mg daily and Lipitor 10 mg daily.  Renewed for a year today\par 2.follow-up in 6-months\par

## 2023-05-08 NOTE — PHYSICAL EXAM
[Well Developed] : well developed [Normal Conjunctiva] : normal conjunctiva [Normal Venous Pressure] : normal venous pressure [Normal S1, S2] : normal S1, S2 [No Rub] : no rub [No Gallop] : no gallop [Normal] : well developed, well nourished, no acute distress [de-identified] : Systolic murmur at apex3/6

## 2023-05-08 NOTE — HISTORY OF PRESENT ILLNESS
[FreeTextEntry1] : The patient is a 62year-old  male with a history of a VSD repair at age 15.t. Patient has Ascending aortic aneurysm with Severe Mitral Regurgitation. Patient was seen by Dr Hernadez, Patient refused any open heart procedure.  Patient had a stress echo in 3/2020. Patient has  OLIVER.  On a prior visit in September 2022, I suggested him to follow-up with Dr. Hernadez CT surgery, PATIENT STATES THAT NO MATTER WHAT HAPPENS HE DOES NOT WANT TO HAVE OPEN HEART SURGERY, SO HE REFUSED TO SEE CT SURGERY, PATIENT AND HIS AUNT AWARE OF POTENTIAL FOR SUDDEN DEATH WITH ANEURYSM RUPTURE. He also stated that he does not want to do the echo\par \par Patient is here for f/u. Patient reports compliance with taking Toprol and Lipitor. Patient has no complaints\par Patient denies any chest pain, dyspnea, palpitations, syncope.  No change in medical history since last visit. \par \par Patient has no history of diabetes, no history of hypertension, no history of prior coronary artery disease, no history of CHF. No history of cardiac arrhythmias.\par \par \par  Consent (Lip)/Introductory Paragraph: The rationale for Mohs was explained to the patient and consent was obtained. The risks, benefits and alternatives to therapy were discussed in detail. Specifically, the risks of lip deformity, changes in the oral aperture, infection, scarring, bleeding, prolonged wound healing, incomplete removal, allergy to anesthesia, nerve injury and recurrence were addressed. Prior to the procedure, the treatment site was clearly identified and confirmed by the patient. All components of Universal Protocol/PAUSE Rule completed.

## 2024-03-15 NOTE — H&P PST ADULT - NSSUBSTANCEUSE_GEN_ALL_CORE_SD
[FreeTextEntry1] : Annual physical: f/u routine labwork Mild sleep apnea: no cpap recommended, recommend exercise/wt loss, will ct monitor Inflammatory arthritis/gout: f/u rheumatology, Dr. Hubbard HLD: Recommend low fat diet, wt loss, exercise, nutritional counseling provided, f/u lipid panel Hepatic steatosis: Recommend low fat diet, wt loss, exercise, and DASH diet, avoid excess alcohol/tylenol, f/u LFTs Hemorrhoids: recommend high fiber diet Obesity: Recommend low fat diet, wt loss, exercise, and DASH diet. RTC 3 wks caffeine